# Patient Record
Sex: FEMALE | Race: WHITE | NOT HISPANIC OR LATINO | Employment: OTHER | URBAN - METROPOLITAN AREA
[De-identification: names, ages, dates, MRNs, and addresses within clinical notes are randomized per-mention and may not be internally consistent; named-entity substitution may affect disease eponyms.]

---

## 2017-05-26 ENCOUNTER — CONVERSION ENCOUNTER (OUTPATIENT)
Dept: MAMMOGRAPHY | Facility: HOSPITAL | Age: 72
End: 2017-05-26

## 2020-03-03 ENCOUNTER — OFFICE VISIT CONVERTED (OUTPATIENT)
Dept: OTHER | Facility: HOSPITAL | Age: 75
End: 2020-03-03
Attending: NURSE PRACTITIONER

## 2020-03-03 ENCOUNTER — CONVERSION ENCOUNTER (OUTPATIENT)
Dept: OTHER | Facility: HOSPITAL | Age: 75
End: 2020-03-03

## 2020-03-03 ENCOUNTER — HOSPITAL ENCOUNTER (OUTPATIENT)
Dept: OTHER | Facility: HOSPITAL | Age: 75
Discharge: HOME | End: 2020-03-03
Attending: NURSE PRACTITIONER

## 2020-03-03 LAB
ALBUMIN SERPL-MCNC: 3.6 G/DL (ref 3.5–5)
ALBUMIN/GLOB SERPL: 0.8 {RATIO} (ref 1.4–2.6)
ALP SERPL-CCNC: 121 U/L (ref 43–160)
ALT SERPL-CCNC: 11 U/L (ref 10–40)
ANION GAP SERPL CALC-SCNC: 21 MMOL/L (ref 8–19)
AST SERPL-CCNC: 18 U/L (ref 15–50)
BASOPHILS # BLD AUTO: 0.07 10*3/UL (ref 0–0.2)
BASOPHILS NFR BLD AUTO: 1.3 % (ref 0–3)
BILIRUB SERPL-MCNC: 0.4 MG/DL (ref 0.2–1.3)
BUN SERPL-MCNC: 12 MG/DL (ref 5–25)
BUN/CREAT SERPL: 15 {RATIO} (ref 6–20)
CALCIUM SERPL-MCNC: 9.8 MG/DL (ref 8.7–10.4)
CHLORIDE SERPL-SCNC: 101 MMOL/L (ref 99–111)
CHOLEST SERPL-MCNC: 182 MG/DL (ref 107–200)
CHOLEST/HDLC SERPL: 4 {RATIO} (ref 3–6)
CONV ABS IMM GRAN: 0.02 10*3/UL (ref 0–0.2)
CONV CO2: 23 MMOL/L (ref 22–32)
CONV IMMATURE GRAN: 0.4 % (ref 0–1.8)
CONV TOTAL PROTEIN: 8 G/DL (ref 6.3–8.2)
CREAT UR-MCNC: 0.79 MG/DL (ref 0.5–0.9)
DEPRECATED RDW RBC AUTO: 64.7 FL (ref 36.4–46.3)
EOSINOPHIL # BLD AUTO: 0.34 10*3/UL (ref 0–0.7)
EOSINOPHIL # BLD AUTO: 6.1 % (ref 0–7)
ERYTHROCYTE [DISTWIDTH] IN BLOOD BY AUTOMATED COUNT: 15.9 % (ref 11.7–14.4)
GFR SERPLBLD BASED ON 1.73 SQ M-ARVRAT: >60 ML/MIN/{1.73_M2}
GLOBULIN UR ELPH-MCNC: 4.4 G/DL (ref 2–3.5)
GLUCOSE SERPL-MCNC: 93 MG/DL (ref 65–99)
HCT VFR BLD AUTO: 36.3 % (ref 37–47)
HDLC SERPL-MCNC: 46 MG/DL (ref 40–60)
HGB BLD-MCNC: 11.2 G/DL (ref 12–16)
LDLC SERPL CALC-MCNC: 106 MG/DL (ref 70–100)
LYMPHOCYTES # BLD AUTO: 0.62 10*3/UL (ref 1–5)
LYMPHOCYTES NFR BLD AUTO: 11.2 % (ref 20–45)
MCH RBC QN AUTO: 34.4 PG (ref 27–31)
MCHC RBC AUTO-ENTMCNC: 30.9 G/DL (ref 33–37)
MCV RBC AUTO: 111.3 FL (ref 81–99)
MONOCYTES # BLD AUTO: 0.25 10*3/UL (ref 0.2–1.2)
MONOCYTES NFR BLD AUTO: 4.5 % (ref 3–10)
NEUTROPHILS # BLD AUTO: 4.26 10*3/UL (ref 2–8)
NEUTROPHILS NFR BLD AUTO: 76.5 % (ref 30–85)
NRBC CBCN: 0 % (ref 0–0.7)
OSMOLALITY SERPL CALC.SUM OF ELEC: 291 MOSM/KG (ref 273–304)
PLATELET # BLD AUTO: 461 10*3/UL (ref 130–400)
PMV BLD AUTO: 11.5 FL (ref 9.4–12.3)
POTASSIUM SERPL-SCNC: 4.2 MMOL/L (ref 3.5–5.3)
RBC # BLD AUTO: 3.26 10*6/UL (ref 4.2–5.4)
SODIUM SERPL-SCNC: 141 MMOL/L (ref 135–147)
T4 FREE SERPL-MCNC: 1.4 NG/DL (ref 0.9–1.8)
TRIGL SERPL-MCNC: 151 MG/DL (ref 40–150)
TSH SERPL-ACNC: 2.98 M[IU]/L (ref 0.27–4.2)
VIT B12 SERPL-MCNC: 484 PG/ML (ref 211–911)
VLDLC SERPL-MCNC: 30 MG/DL (ref 5–37)
WBC # BLD AUTO: 5.56 10*3/UL (ref 4.8–10.8)

## 2020-03-04 LAB
IRON SATN MFR SERPL: 20 % (ref 20–55)
IRON SERPL-MCNC: 60 UG/DL (ref 60–170)
TIBC SERPL-MCNC: 302 UG/DL (ref 245–450)
TRANSFERRIN SERPL-MCNC: 211 MG/DL (ref 250–380)

## 2020-03-05 LAB
CONV ANTI MICROSOMAL AB: 26 IU/ML (ref 0–34)
THYROGLOBULIN ANTIBODY: <1 IU/ML (ref 0–0.9)

## 2020-04-06 ENCOUNTER — TELEMEDICINE CONVERTED (OUTPATIENT)
Dept: OTHER | Facility: HOSPITAL | Age: 75
End: 2020-04-06
Attending: NURSE PRACTITIONER

## 2020-04-07 ENCOUNTER — HOSPITAL ENCOUNTER (OUTPATIENT)
Dept: OTHER | Facility: HOSPITAL | Age: 75
Discharge: HOME | End: 2020-04-07
Attending: NURSE PRACTITIONER

## 2020-04-07 LAB
BASOPHILS # BLD AUTO: 0.05 10*3/UL (ref 0–0.2)
BASOPHILS NFR BLD AUTO: 1.1 % (ref 0–3)
CONV ABS IMM GRAN: 0.02 10*3/UL (ref 0–0.2)
CONV IMMATURE GRAN: 0.4 % (ref 0–1.8)
DEPRECATED RDW RBC AUTO: 64.1 FL (ref 36.4–46.3)
EOSINOPHIL # BLD AUTO: 0.41 10*3/UL (ref 0–0.7)
EOSINOPHIL # BLD AUTO: 8.9 % (ref 0–7)
ERYTHROCYTE [DISTWIDTH] IN BLOOD BY AUTOMATED COUNT: 15.7 % (ref 11.7–14.4)
HCT VFR BLD AUTO: 34.4 % (ref 37–47)
HGB BLD-MCNC: 10.5 G/DL (ref 12–16)
LYMPHOCYTES # BLD AUTO: 0.61 10*3/UL (ref 1–5)
LYMPHOCYTES NFR BLD AUTO: 13.3 % (ref 20–45)
MCH RBC QN AUTO: 34.1 PG (ref 27–31)
MCHC RBC AUTO-ENTMCNC: 30.5 G/DL (ref 33–37)
MCV RBC AUTO: 111.7 FL (ref 81–99)
MONOCYTES # BLD AUTO: 0.35 10*3/UL (ref 0.2–1.2)
MONOCYTES NFR BLD AUTO: 7.6 % (ref 3–10)
NEUTROPHILS # BLD AUTO: 3.16 10*3/UL (ref 2–8)
NEUTROPHILS NFR BLD AUTO: 68.7 % (ref 30–85)
NRBC CBCN: 0 % (ref 0–0.7)
PLATELET # BLD AUTO: 377 10*3/UL (ref 130–400)
PMV BLD AUTO: 11.6 FL (ref 9.4–12.3)
RBC # BLD AUTO: 3.08 10*6/UL (ref 4.2–5.4)
WBC # BLD AUTO: 4.6 10*3/UL (ref 4.8–10.8)

## 2020-05-11 ENCOUNTER — TELEPHONE CONVERTED (OUTPATIENT)
Dept: OTHER | Facility: HOSPITAL | Age: 75
End: 2020-05-11
Attending: NURSE PRACTITIONER

## 2020-05-13 ENCOUNTER — HOSPITAL ENCOUNTER (OUTPATIENT)
Dept: OTHER | Facility: HOSPITAL | Age: 75
Discharge: HOME | End: 2020-05-13
Attending: NURSE PRACTITIONER

## 2020-05-14 LAB
C DIFF TOX B STL QL CT TISS CULT: NEGATIVE
CONV 027 TOXIN: NEGATIVE

## 2020-05-15 LAB — BACTERIA SPEC AEROBE CULT: NORMAL

## 2020-06-15 ENCOUNTER — OFFICE VISIT CONVERTED (OUTPATIENT)
Dept: ONCOLOGY | Facility: HOSPITAL | Age: 75
End: 2020-06-15
Attending: INTERNAL MEDICINE

## 2020-06-15 ENCOUNTER — HOSPITAL ENCOUNTER (OUTPATIENT)
Dept: ONCOLOGY | Facility: HOSPITAL | Age: 75
Discharge: HOME | End: 2020-06-15
Attending: INTERNAL MEDICINE

## 2020-06-15 LAB
ALBUMIN SERPL-MCNC: 3.8 G/DL (ref 3.5–5)
ALBUMIN/GLOB SERPL: 1 {RATIO} (ref 1.4–2.6)
ALP SERPL-CCNC: 124 U/L (ref 43–160)
ALT SERPL-CCNC: 9 U/L (ref 10–40)
ANION GAP SERPL CALC-SCNC: 15 MMOL/L (ref 8–19)
AST SERPL-CCNC: 15 U/L (ref 15–50)
BASOPHILS # BLD AUTO: 0.06 10*3/UL (ref 0–0.2)
BASOPHILS NFR BLD AUTO: 1 % (ref 0–3)
BILIRUB SERPL-MCNC: 0.32 MG/DL (ref 0.2–1.3)
BUN SERPL-MCNC: 12 MG/DL (ref 5–25)
BUN/CREAT SERPL: 15 {RATIO} (ref 6–20)
CALCIUM SERPL-MCNC: 9.4 MG/DL (ref 8.7–10.4)
CHLORIDE SERPL-SCNC: 101 MMOL/L (ref 99–111)
CONV ABS IMM GRAN: 0.02 10*3/UL (ref 0–0.2)
CONV CO2: 26 MMOL/L (ref 22–32)
CONV IMMATURE GRAN: 0.3 % (ref 0–1.8)
CONV TOTAL PROTEIN: 7.7 G/DL (ref 6.3–8.2)
CREAT UR-MCNC: 0.81 MG/DL (ref 0.5–0.9)
DEPRECATED RDW RBC AUTO: 61.9 FL (ref 36.4–46.3)
EOSINOPHIL # BLD AUTO: 0.46 10*3/UL (ref 0–0.7)
EOSINOPHIL # BLD AUTO: 7.8 % (ref 0–7)
ERYTHROCYTE [DISTWIDTH] IN BLOOD BY AUTOMATED COUNT: 15 % (ref 11.7–14.4)
FERRITIN SERPL-MCNC: 123 NG/ML (ref 10–200)
FOLATE SERPL-MCNC: 10.4 NG/ML (ref 4.8–20)
GFR SERPLBLD BASED ON 1.73 SQ M-ARVRAT: >60 ML/MIN/{1.73_M2}
GLOBULIN UR ELPH-MCNC: 3.9 G/DL (ref 2–3.5)
GLUCOSE SERPL-MCNC: 98 MG/DL (ref 65–99)
HCT VFR BLD AUTO: 35.1 % (ref 37–47)
HGB BLD-MCNC: 11 G/DL (ref 12–16)
IRON SATN MFR SERPL: 24 % (ref 20–55)
IRON SERPL-MCNC: 71 UG/DL (ref 60–170)
LYMPHOCYTES # BLD AUTO: 0.66 10*3/UL (ref 1–5)
LYMPHOCYTES NFR BLD AUTO: 11.2 % (ref 20–45)
MCH RBC QN AUTO: 34.7 PG (ref 27–31)
MCHC RBC AUTO-ENTMCNC: 31.3 G/DL (ref 33–37)
MCV RBC AUTO: 110.7 FL (ref 81–99)
MONOCYTES # BLD AUTO: 0.35 10*3/UL (ref 0.2–1.2)
MONOCYTES NFR BLD AUTO: 5.9 % (ref 3–10)
NEUTROPHILS # BLD AUTO: 4.34 10*3/UL (ref 2–8)
NEUTROPHILS NFR BLD AUTO: 73.8 % (ref 30–85)
NRBC CBCN: 0 % (ref 0–0.7)
OSMOLALITY SERPL CALC.SUM OF ELEC: 286 MOSM/KG (ref 273–304)
PLATELET # BLD AUTO: 435 10*3/UL (ref 130–400)
PMV BLD AUTO: 11.2 FL (ref 9.4–12.3)
POTASSIUM SERPL-SCNC: 4 MMOL/L (ref 3.5–5.3)
RBC # BLD AUTO: 3.17 10*6/UL (ref 4.2–5.4)
SODIUM SERPL-SCNC: 138 MMOL/L (ref 135–147)
TIBC SERPL-MCNC: 295 UG/DL (ref 245–450)
TRANSFERRIN SERPL-MCNC: 206 MG/DL (ref 250–380)
TSH SERPL-ACNC: 1.17 M[IU]/L (ref 0.27–4.2)
VIT B12 SERPL-MCNC: 778 PG/ML (ref 211–911)
WBC # BLD AUTO: 5.89 10*3/UL (ref 4.8–10.8)

## 2020-06-22 ENCOUNTER — OFFICE VISIT CONVERTED (OUTPATIENT)
Dept: ONCOLOGY | Facility: HOSPITAL | Age: 75
End: 2020-06-22
Attending: INTERNAL MEDICINE

## 2020-06-23 ENCOUNTER — HOSPITAL ENCOUNTER (OUTPATIENT)
Dept: OTHER | Facility: HOSPITAL | Age: 75
Discharge: HOME | End: 2020-06-23
Attending: INTERNAL MEDICINE

## 2020-06-23 LAB
BASOPHILS # BLD AUTO: 0.05 10*3/UL (ref 0–0.2)
BASOPHILS # BLD: 3 % (ref 0–3)
BASOPHILS NFR BLD AUTO: 0.9 % (ref 0–3)
BURR CELLS BLD QL SMEAR: SLIGHT
CLUMPED PLATELETS: ABNORMAL
CONV ABS BANDS: 0 % (ref 1–5)
CONV ABS IMM GRAN: 0.01 10*3/UL (ref 0–0.2)
CONV ANISOCYTES: SLIGHT
CONV IMMATURE GRAN: 0.2 % (ref 0–1.8)
CONV SEGMENTED NEUTROPHILS: 67 % (ref 45–70)
DACRYOCYTES BLD QL SMEAR: ABNORMAL
DEPRECATED RDW RBC AUTO: 64 FL (ref 36.4–46.3)
EOSINOPHIL # BLD AUTO: 0.53 10*3/UL (ref 0–0.7)
EOSINOPHIL # BLD AUTO: 10 % (ref 0–7)
EOSINOPHIL NFR BLD AUTO: 12 % (ref 0–7)
ERYTHROCYTE [DISTWIDTH] IN BLOOD BY AUTOMATED COUNT: 15.4 % (ref 11.7–14.4)
HCT VFR BLD AUTO: 33.9 % (ref 37–47)
HGB BLD-MCNC: 10.2 G/DL (ref 12–16)
LARGE PLATELETS: SLIGHT
LDH SERPL-CCNC: 209 U/L (ref 120–240)
LYMPHOCYTES # BLD AUTO: 0.83 10*3/UL (ref 1–5)
LYMPHOCYTES NFR BLD AUTO: 15.7 % (ref 20–45)
MACROCYTES BLD QL SMEAR: SLIGHT
MCH RBC QN AUTO: 34.1 PG (ref 27–31)
MCHC RBC AUTO-ENTMCNC: 30.1 G/DL (ref 33–37)
MCV RBC AUTO: 113.4 FL (ref 81–99)
MONOCYTES # BLD AUTO: 0.32 10*3/UL (ref 0.2–1.2)
MONOCYTES NFR BLD AUTO: 6.1 % (ref 3–10)
MONOCYTES NFR BLD MANUAL: 1 % (ref 3–10)
NEUTROPHILS # BLD AUTO: 3.54 10*3/UL (ref 2–8)
NEUTROPHILS NFR BLD AUTO: 67.1 % (ref 30–85)
NRBC CBCN: 0 % (ref 0–0.7)
NUC CELL # PRT MANUAL: 0 /100{WBCS}
OVALOCYTES BLD QL SMEAR: SLIGHT
PLAT MORPH BLD: NORMAL
PLATELET # BLD AUTO: 433 10*3/UL (ref 130–400)
PMV BLD AUTO: 11.9 FL (ref 9.4–12.3)
POIKILOCYTOSIS BLD QL SMEAR: SLIGHT
POLYCHROMASIA BLD QL SMEAR: ABNORMAL
RBC # BLD AUTO: 2.99 10*6/UL (ref 4.2–5.4)
RETICS # AUTO: 0.07 10*6/UL (ref 0.02–0.08)
RETICS/RBC NFR AUTO: 2.34 % (ref 0.5–1.7)
SMALL PLATELETS BLD QL SMEAR: ADEQUATE
VARIANT LYMPHS NFR BLD MANUAL: 17 % (ref 20–45)
WBC # BLD AUTO: 5.28 10*3/UL (ref 4.8–10.8)

## 2020-06-25 LAB — HAPTOGLOB SERPL-MCNC: 224 MG/DL (ref 42–346)

## 2020-06-29 LAB
ALBUMIN 24H MFR UR ELPH: 24.8 %
ALPHA1 GLOB 24H MFR UR ELPH: 1.1 %
ALPHA2 GLOB 24H MFR UR ELPH: 10.1 %
B-GLOBULIN MFR UR ELPH: 32.8 %
CONV IMMUNOFIXATION (URINE): NORMAL
CONV PE NOTE: NORMAL
GAMMA GLOB 24H MFR UR ELPH: 31.2 %
M PROTEIN MFR UR ELPH: NORMAL %
PROT UR-MCNC: 25.7 MG/DL

## 2020-07-23 ENCOUNTER — OFFICE VISIT CONVERTED (OUTPATIENT)
Dept: ONCOLOGY | Facility: HOSPITAL | Age: 75
End: 2020-07-23
Attending: INTERNAL MEDICINE

## 2020-09-22 ENCOUNTER — OFFICE VISIT CONVERTED (OUTPATIENT)
Dept: ONCOLOGY | Facility: HOSPITAL | Age: 75
End: 2020-09-22
Attending: INTERNAL MEDICINE

## 2020-12-22 ENCOUNTER — OFFICE VISIT CONVERTED (OUTPATIENT)
Dept: ONCOLOGY | Facility: HOSPITAL | Age: 75
End: 2020-12-22
Attending: INTERNAL MEDICINE

## 2021-03-22 ENCOUNTER — OFFICE VISIT CONVERTED (OUTPATIENT)
Dept: ONCOLOGY | Facility: HOSPITAL | Age: 76
End: 2021-03-22
Attending: INTERNAL MEDICINE

## 2021-05-12 NOTE — PROGRESS NOTES
Quick Note      Patient Name: Calli Pina   Patient ID: [id number]   Sex: Female   YOB: 1945    Primary Care Provider: Ara LLANOS   Referring Provider: Ara LLANOS    Visit Date: April 6, 2020    Provider: ROMI Mcclure   Location: [facility]   Location Address: [address] [place] [address]  [id number]   Location Phone: [phone]          History Of Present Illness  TELEHEALTH VISIT  Chief Complaint: Follow up   Calli Pina is a 75 year old /White female who is presenting for evaluation via telehealth visit. Verbal consent obtained before beginning visit.   Provider spent 11 minutes with the patient during telehealth visit.   The following staff were present during this visit: ROMI Flower and Laura Hope CMA   Past Medical History/Overview of Patient Symptoms     **Consent for Telephone visit given    Follow up on Fatigue, Vitamin B12. Please see last visit hemoglobin hematocrit were 11.2 and 36.3, .3, MCH 34.4, platelets were 461.  At that time we increased her vitamin B12 to weekly and we are going to repeat her CBC at next visit. Continues to complains of weight loss and weakness. She is down to 152 this morning. Has lost about 20 pounds since the first of the year.     Complains of middle/lower back pain for long time, seems to be getting worse. States she can do very little and will have to sit down to rest. Pain is the worse when she is up and moving and cleaning the house, pain eases with rest. Describes pain as a strong dull ache, denies any problems with her legs or feet. Admits that she has taken Ibuprofen in the past with improvement.     Time in: 1026  Time: 1037           Assessment  · Vitamin B12 deficiency     266.2/E53.8  · Weakness     780.79/R53.1  · Weight loss, non-intentional     783.21/R63.4  · Abnormal CBC     790.6/R79.89  · Low back pain     724.2/M54.5    Problems Reconciled  Plan  · Orders  o Physician  Telephone Evaluation, 11-20 minutes (40873) - - 04/06/2020  · Medications  o naproxen 500 mg oral tablet,delayed release (DR/EC)   SIG: take 1 tablet (500 mg) by oral route 2 times per day with food PRN for low back pain   DISP: (60) tablets with 1 refills  Prescribed on 04/06/2020     o Medications have been Reconciled  o Transition of Care or Provider Policy  · Instructions  o Plan Of Care: We will repeat patient's CBC, pending those results we will consider hematology referral. Discussed this with patient and she voiced understanding and all questions answered.  o Patient was educated/instructed on their diagnosis, treatment and medications prior to discharge from the clinic today.  o Take all medications as prescribed/directed.  o After discussing patient's back pain with her it appears that it is likely a degenerative disc disease, but we will not know this without an x-ray. I do not feel we need to do an x-ray at this time related to the Covid 19 pandemic, we will treat her with naproxen 500 mg twice daily on an as-needed basis for pain. Instructed patient to take this with food and that she only needs to take it when she is hurting. Patient voiced understanding. We will get an x-ray as soon as restrictions have been lifted.  · Disposition  o Follow Up PRN            Electronically Signed by: ROMI Mcclure -Author on April 6, 2020 11:44:18 AM

## 2021-05-13 NOTE — PROGRESS NOTES
Quick Note      Patient Name: Calli Pina   Patient ID: [id number]   Sex: Female   YOB: 1945    Primary Care Provider: Ara LLANOS   Referring Provider: Ara LLANOS    Visit Date: May 11, 2020    Provider: ROMI Mcclure   Location: [facility]   Location Address: [address] [place] [address]  [id number]   Location Phone: [phone]          History Of Present Illness  TELEHEALTH TELEPHONE VISIT  Chief Complaint: Follow up/ Diarrhea   Calli Pina is a 75 year old /White female who is presenting for evaluation via telehealth telephone visit. Verbal consent obtained before beginning visit.   Provider spent 11 minutes with the patient during telehealth visit.   The following staff were present during this visit: ROMI Flower and Laura Hope CMA   Past Medical History/Overview of Patient Symptoms     Patient consented to consult via telephone    Follow up on Low back pain and no energy. Has stopped taking naproxen for low back pain due to stomach issues. Admits to improvement with the Naproxen and had some abdominal pain therefore she has stopped and her stomach pain has improved.     Complains of diarrhea for years, worse 10-20 minutes after big meals. Concerned about C.diff. Patient does admit to some abdominal pain with diarrhea, denies any fever or chills. Patient has not been on any antibiotics recently or been in the hospital.  Not currently taking iron medication.    Patient does have an abnormal CBC white blood cells 4.6, RBCs 3.08, hemoglobin 10.5, hematocrit 34.4, , platelets 377.  Patient has seen Dr. pool in the past and was put through multiple testing.  All of which was negative.  Her last C scope was a few years ago at [place] they were not able to make it through her complete bowel and so they did a small bowel series.  She does have an appointment with hematology in [facility] on [address].    Time In: 1205  Time Out: 1216            Assessment  · Diarrhea     787.91/R19.7  · Fatigue     780.79/R53.83  · Vitamin B12 deficiency     266.2/E53.8  · Abnormal CBC     790.6/R79.89  · Weakness     780.79/R53.1    Problems Reconciled  Plan  · Orders  o C difficile Toxigenic Assay (PCR) [facility] (49570) - 787.91/R19.7 - 05/11/2020  o Giardia and Cryptosporidium Enzyme Immunoassay [facility] (25385, 76161) - 787.91/R19.7 - 05/11/2020  o Lactoferrin (Fecal) Qualitative (44741) - 787.91/R19.7 - 05/11/2020  o Stool culture and sensitivity (98660) - 787.91/R19.7 - 05/11/2020  o Physician Telephone Evaluation, 11-20 minutes (95364) - - 05/11/2020  o Occult blood, fecal, guaiac, 1-3 samples [facility] (41746) - 790.6/R79.89, 787.91/R19.7 - 05/11/2020  · Medications  o Iron (ferrous sulfate) 325 mg (65 mg iron) oral tablet   SIG: take 1 tablet (325 mg) by oral route once daily   DISP: (0) tablet with 0 refills  Discontinued on 05/11/2020     o naproxen 500 mg oral tablet,delayed release (DR/EC)   SIG: take 1 tablet (500 mg) by oral route 2 times per day with food PRN for low back pain   DISP: (60) tablets with 1 refills  Discontinued on 05/11/2020     o Medications have been Reconciled  o Transition of Care or Provider Policy  · Instructions  o Plan Of Care: At this time we will continue current plan of care, will reach out and get her records from previous hematologist and C scope and small bowel series. We will forward this information on to new hematology. We will also get a stool study to follow-up with the diarrhea. Discussed with patient that it may be possible that she will need another colonoscopy. Patient voiced understanding and all questions answered.  o Patient instructed to seek medical attention urgently for new or worsening symptoms.  o Patient is taking medications as prescribed and doing well.   o Call the office with any concerns or questions.  o Discussed Covid-19 precautions including, but not limited to, social distancing, avoid touching your face,  and hand washing.   · Disposition  o Follow Up PRN     EMR dragon/transcription disclaimer: Much of this encounter note is an electronic transcription/translation of spoken language to printed text.  Electronic translation of spoken language may permit erroneous, or at times nonsensical words or phrases to be inadvertently transcribed; although I have reviewed the note for such errors, some may still exist             Electronically Signed by: ROMI Mcclure -Author on May 11, 2020 02:14:35 PM

## 2021-05-15 VITALS
HEIGHT: 65 IN | DIASTOLIC BLOOD PRESSURE: 72 MMHG | SYSTOLIC BLOOD PRESSURE: 130 MMHG | HEART RATE: 68 BPM | OXYGEN SATURATION: 94 % | WEIGHT: 161.25 LBS | TEMPERATURE: 96.8 F | BODY MASS INDEX: 26.87 KG/M2

## 2021-05-28 VITALS
RESPIRATION RATE: 14 BRPM | HEIGHT: 65 IN | TEMPERATURE: 98 F | BODY MASS INDEX: 25.49 KG/M2 | OXYGEN SATURATION: 94 % | WEIGHT: 153 LBS | DIASTOLIC BLOOD PRESSURE: 68 MMHG | HEART RATE: 76 BPM | SYSTOLIC BLOOD PRESSURE: 128 MMHG

## 2021-05-28 NOTE — PROGRESS NOTES
"Patient: ELEUTERIO PINA     Acct: [id number]     Report: #[id number]  UNIT #: [id number]     : 1945    Encounter Date:06/15/2020  PRIMARY CARE: STEVO BOBO  ***Signed***  --------------------------------------------------------------------------------------------------------------------  NURSE INTAKE      Visit Type      New Patient Visit            Chief Complaint      ABNORMAL CBC            Referring Provider/Copies To      Referring Provider:  STEVO BOBO      Primary Care Provider:  STEVO BOBO      Copies To:   STEVO BOBO            History and Present Illness      Past History      Ms. Pina is a 75-year-old female who was referred to me by her primary care     provider Stevo Bobo for macrocytic anemia.            The patient's most recent labs were on 2020.  They show WBC count of 4.6,     hemoglobin 10.5, .7, platelet count 377, absolute lymphocyte count 0.61.     The rest of the differential was complete normal.  Patient states she has felt     \"crummy\" for the last year or more.  She is also been losing weight.  She has     diarrhea often after every meal.  Her last colonoscopy was a few years ago and     she believes it was incomplete.            She is on levothyroxine for hypothyroidism.  She said she had radioactive iodine    ablation in .  She reports her thyroid levels have been normal recently.      She recently had a cardiac ablation due to atrial fibrillation.  Records from     primary care provider states that she has been on monthly B12 injections for her    macrocytic anemia.  She also started taking oral iron supplementation in     2020 for fatigue but this did not help.            Patient states that she saw Dr. Adkins about 5 years ago for the same issues.  She    is unaware if there is any specific diagnosis but says that she does not wish to    go back there.            PAST, FAMILY   Past Medical History      Past Medical History:  " Thyroid Disease      Other PMH:        GLAUCOMA      Hematology/Oncology (F):  Anemia            Past Surgical History      Cataract Surgery, Cholecystectomy            HEART ABLATION, HEART MONITOR      RADIOACTIVE IDOINE FOR THYROID (RADITION)            Family History      Family History:  Stomach Cancer (BROTHER)            Maternal grandmother had an undiagnosed blood condition and  at the      age of 70, brother with stomach cancer            Social History      Marital Status:        Lives independently:  Yes      Number of Children:  3            Tobacco Use      Tobacco status:  Former smoker (Quit in )            Alcohol Use      Alcohol intake:  None            Substance Use      Substance use:  Denies use            REVIEW OF SYSTEMS      General:  Admits: Fatigue, Weight Loss;          Denies: Appetite Change, Fever, Night Sweats, Weight Gain      Eye:  Denies Blurred Vision, Denies Corrective Lenses, Denies Diplopia, Denies     Vision Changes      ENT:  Denies Headache; Admits Hearing Loss; Denies Hoarseness, Denies Sore     Throat      Cardiovascular:  Denies Chest Pain; Admits Palpitations      Respiratory:  Denies: Cough, Coughing Blood, Productive Cough, Shortness of Air,    Wheezing      Gastrointestinal:  Admits Diarrhea; Denies Bloody Stools, Denies Constipation,     Denies Nausea/Vomiting, Denies Problem Swallowing, Denies Unable to Control     Bowels      Genitourinary:  Denies Blood in Urine; Admits Incontinence (URINARY); Denies     Painful Urination      Musculoskeletal:  Admits Back Pain; Denies Muscle Pain, Denies Painful Joints      Integumentary:  Denies Itching, Denies Lesions, Denies Rash      Neurologic:  Admits Dizziness; Denies Numbness\Tingling, Denies Seizures      Psychiatric:  Denies Anxiety, Denies Depression      Endocrine:  Denies Cold Intolerance, Denies Heat Intolerance      Hematologic/Lymphatic:  Denies Bruising, Denies Bleeding, Denies Enlarged Lymph      Nodes      Reproductive:  Denies: Menopause, Heavy Periods, Pregnant, Still Menstruating            VITAL SIGNS AND SCORES      Vitals      Height 5 ft 4.96 in / 165 cm      Weight 152 lbs 15.988 oz / 69.4 kg      BSA 1.76 m2      BMI 25.5 kg/m2      Temperature 98.0 F / 36.67 C - Temporal      Pulse 76      Respirations 14      Blood Pressure 128/68 Sitting, Right Arm      Pulse Oximetry 94%, ROOM AIR            Pain Score      Experiencing any pain?:  Yes (BACK PAIN)      Pain Scale Used:  Numerical      Pain Intensity:  7            Fatigue Score      Experiencing any fatigue?:  Yes      Fatigue (0-10 scale):  8            EXAM      General: Alert, cooperative, no acute distress      Eyes: Anicteric sclera, PERRLA      HEENT: Oropharynx clear, no exudates      Respiratory: CTAB, normal respiratory effort      Abdomen: Normal active bowel sounds, no tenderness, no distention      Cardiovascular: RRR, no murmur, no peripheral edema      Skin: Normal tone, no rash, no lesions      Psychiatric: Appropriate affect, intact judgment      Neurologic: No focal sensory or motor deficits, no weakness, numbness, dizziness      Musculoskeletal: Normal muscle strength, normal muscle tone      Extremities: No clubbing, cyanosis, or deformities            PREVENTION      Hx Influenza Vaccination:  Yes      Date Influenza Vaccine Given:  Oct 1, 2019      Influenza Vaccine Declined:  No      2 or More Falls Past Year?:  Yes      Fall Past Year with Injury?:  No      Hx Pneumococcal Vaccination:  No      Chart initiated by      ADELA STONE MA            ALLERGY/MEDS      Allergies      Coded Allergies:             *No Known Allergies (Verified  Allergy, Unknown, 6/15/20)            Medications            Propylene Glycol/ (Systane Gel Eye Drops) 10 Ml Drops.gel      1 DROPS EYE EACH QDAY PRN for EYE PROTECTION, #10 ML         Reported         6/15/20       (Areds 2 - Eye Vit)   No Conflict Check      1 TAB PO BID          Prov: MARCIE CHÁVEZ         6/15/20       (Eye Drops - Glaucoma)   No Conflict Check      1 DROPS EYE EACH HS         Reported         6/15/20       Levothyroxine (Levothyroxine) 0.088 Mg Tablet      0.088 MG PO QDAY@07, #30 TAB 0 Refills         Reported         6/15/20       Omeprazole (priLOSEC) 40 Mg Capsule.dr      40 MG PO QDAY for 30 Days, #30 CAP 0 Refills         Reported         6/15/20       Cyanocobalamin (Vitamin B-12) 1,000 Mcg/Ml Vial      1000 MCG IM QMONTH, VIAL 0 Refills         Reported         8/20/09      Medications Reviewed:  Changes made to meds            IMPRESSION/PLAN      Impression      75-year-old female with macrocytic anemia.            Diagnosis      Abnormal CBC - R79.89            Anemia - D64.9            B12 deficiency - E53.8            Notes      New Medications      * OMEPRAZOLE (priLOSEC) 40 MG CAPSULE.DR: 40 MG PO QDAY 30 Days #30      * Levothyroxine 0.088 MG TABLET: 0.088 MG PO QDAY@07 #30      * (EYE DROPS - GLAUCOMA): 1 DROPS EYE EACH HS      * (AREDS 2 - EYE VIT): 1 TAB PO BID      * Propylene Glycol/ (Systane Gel Eye Drops) 10 ML DROPS.GEL: 1 DROPS EYE       EACH QDAY PRN EYE PROTECTION #10      New Diagnostics      * CBC With Auto Diff, Routine         Dx: Abnormal CBC - R79.89      * CMP Comp Metabolic Panel, Routine         Dx: Abnormal CBC - R79.89      * B12      Dx: Abnormal CBC - R79.89      * Iron Profile, Routine         Dx: Abnormal CBC - R79.89      * Ferritin Level, Routine         Dx: Abnormal CBC - R79.89      * Tsh (Ultra-Sensitive, Routine         Dx: Abnormal CBC - R79.89            Plan      Macrocytic anemia: Typical causes of macrocytic anemia are B12 and folate     deficiency.  The patient has been on B12 injections but is unclear for how long.     I will repeat basic labs today including a CBC with differential, CMP,     B12/folate, iron studies with ferritin, and TSH.  I will call her in 1 week with    results of these labs.  Further  labs will be based on the results of these.            Diarrhea: Patient reports having diarrhea after every meal.  I suggested that sh    e should have a repeat colonoscopy I will discuss this again with the patient at    the follow-up appointment.  This may be causing poor absorption of     micronutrients leading to her hematologic abnormalities.            Patient Education      Patient Education Provided:  Yes            Electronically signed by MARCIE CHÁVEZ  06/21/2020 21:36       Disclaimer: Converted document may not contain table formatting or lab diagrams. Please see [employer] for the authenticated document.

## 2021-05-28 NOTE — PROGRESS NOTES
"Patient: CALLI PINA     Acct: [id number]     Report: #[id number]  UNIT #: [id number]     : 1945    Encounter Date:2021  PRIMARY CARE: STEVO BOBO  ***Signed***  --------------------------------------------------------------------------------------------------------------------  TELEHEALTH NOTE      Past Oncology Illness History      Ms. Pina is a 76-year-old female who was referred to me by her primary care     provider Stevo Bobo for macrocytic anemia.            The patient's most recent labs were on 2020.  They show WBC count of 4.6,     hemoglobin 10.5, .7, platelet count 377, absolute lymphocyte count 0.61.     The rest of the differential was complete normal.  Patient states she has felt     \"crummy\" for the last year or more.  She is also been losing weight.  She has     diarrhea often after every meal.  Her last colonoscopy was a few years ago and     she believes it was incomplete.            She is on levothyroxine for hypothyroidism.  She said she had radioactive iodine    ablation in .  She reports her thyroid levels have been normal recently.      She recently had a cardiac ablation due to atrial fibrillation.  Records from     primary care provider states that she has been on monthly B12 injections for her    macrocytic anemia.  She also started taking oral iron supplementation in     eb2020 for fatigue but this did not help.            Patient states that she saw Dr. Adkins about 5 years ago for the same issues.  She    is unaware if there is any specific diagnosis but says that she does not wish to    go back there.            2020: WBC count 5.28, hemoglobin 10.2, , platelet count 433,     reticulocytes 2.34%, LDH within normal limits, transferrin saturation and     ferritin within normal limits.            History of Present Illness            Chief Complaint: (abn cbc f/u)            Calli Pina is presenting for " evaluation via Telehealth visit by phone.     Verbal consent obtained before beginning visit.            Provider spent (14) minutes with the patient during telehealth visit.            The following staff were present during the visit: (Tonya Main, RN)                         Overview of Symptoms      I contacted the patient today regarding recent labs.  She had these done at [place] which is where she sees her primary care provider.  Her labs are     essentially unchanged from previous.  Her hemoglobin is down to 10.1 but this is    not significantly different from other values in the computer.  Last time it was    11.0 but has been as low as 10 before.  Her MCV is also 104.5.  This has been     higher as well.  I explained to the patient that I would like to continue to get    a CBC with differential every 3 months since she has not had a bone marrow bi    opsy.  However, the patient says she is feeling better than she did in the past.     Her energy is improved.  Her weight loss has now stabilized.  Her weight is 143    pounds and was previously 176 but has not dropped further over the last several     months.  Patient gets labs through her primary care provider every 6 months and     asked that we alternate visits and labs with the other provider.            Allergies/Medications      Allergies:        Coded Allergies:             *No Known Allergies (Verified  Allergy, Unknown, 3/22/21)      Medications    Last Reconciled on 3/22/21 18:15 by MARCIE CHÁVEZ      Propylene Glycol/ (Systane Gel Eye Drops) 10 Ml Drops.gel      1 DROPS EYE EACH QDAY PRN for EYE PROTECTION, #10 ML         Reported         6/15/20       (Areds 2 - Eye Vit)   No Conflict Check      1 TAB PO BID         Prov: MARCIE CHÁVEZ         6/15/20       (Eye Drops - Glaucoma)   No Conflict Check      1 DROPS EYE EACH HS         Reported         6/15/20       Levothyroxine (Levothyroxine) 0.088 Mg Tablet      0.088 MG PO QDAY@07, #30  TAB 0 Refills         Reported         6/15/20       Omeprazole (priLOSEC) 40 Mg Capsule.dr      40 MG PO QDAY for 30 Days, #30 CAP 0 Refills         Reported         6/15/20       Cyanocobalamin (Vitamin B-12) 1,000 Mcg/Ml Vial      1000 MCG IM QMONTH, VIAL 0 Refills         Reported         8/20/09            Plan/Instructions      Ambulatory Assessment/Plan:        Macrocytic anemia - D53.9            Notes      New Diagnostics      * CBC With Auto Diff, 6 Months         Dx: Macrocytic anemia - D53.9      * Comp Metabolic Panel, 6 Months         Dx: Macrocytic anemia - D53.9      * LDH, 6 Months         Dx: Macrocytic anemia - D53.9      Plan/Instructions            * Plan Of Care: (Macrocytic anemia)      * Concern for MDS.  Patient's counts are stable and she continues to refuse bone      marrow biopsy.  She would like to follow-up with me every 6 months since she       has had anemia for many years.  I continue to recommend every 3-month CBC       since she has not undergone bone marrow biopsy and we do not know the exact       etiology of her cytopenia.  However, the patient is fairly insistent that we       get labs from her primary care provider and follow-up after.  Therefore I will      schedule a follow-up appointment in 6 months and attempt to get the labs from       [place].  Patient tells me that she will call if there are any new concerns      or if she is found to have worsening cytopenias.            * Chronic conditions reviewed and taken into consideration for today's treatment      plan.      * Patient instructed to seek medical attention urgently for new or worsening       symptoms.      * Patient was educated/instructed on their diagnosis, treatment and medications       prior to discharge from the clinic today.      Codes:  Phone Eval 11-20 mi [id number]            Electronically signed by MARCIE CHÁVEZ  03/22/2021 18:15       Disclaimer: Converted document may not contain table formatting or  lab diagrams. Please see [email or web address] for the authenticated document.

## 2021-05-28 NOTE — PROGRESS NOTES
"Patient: CALLI PINA     Acct: [id number]     Report: #[id number]  UNIT #: [id number]     : 1945    Encounter Date:2020  PRIMARY CARE: STEVO BOBO  ***Signed***  --------------------------------------------------------------------------------------------------------------------  TELEHEALTH NOTE      Past Oncology Illness History      Ms. Pina is a 75-year-old female who was referred to me by her primary care     provider Stevo Bobo for macrocytic anemia.            The patient's most recent labs were on 2020.  They show WBC count of 4.6,     hemoglobin 10.5, .7, platelet count 377, absolute lymphocyte count 0.61.     The rest of the differential was complete normal.  Patient states she has felt     \"crummy\" for the last year or more.  She is also been losing weight.  She has     diarrhea often after every meal.  Her last colonoscopy was a few years ago and     she believes it was incomplete.            She is on levothyroxine for hypothyroidism.  She said she had radioactive iodine    ablation in .  She reports her thyroid levels have been normal recently.      She recently had a cardiac ablation due to atrial fibrillation.  Records from     primary care provider states that she has been on monthly B12 injections for her    macrocytic anemia.  She also started taking oral iron supplementation in     ebru2020 for fatigue but this did not help.            Patient states that she saw Dr. Adkins about 5 years ago for the same issues.  She    is unaware if there is any specific diagnosis but says that she does not wish to    go back there.            2020: WBC count 5.28, hemoglobin 10.2, , platelet count 433,     reticulocytes 2.34%, LDH within normal limits, transferrin saturation and     ferritin within normal limits.            History of Present Illness            Chief Complaint: ABNORMAL CBC             Calli Pina is presenting for " evaluation via Telehealth visit by phone.     Verbal consent obtained before beginning visit.            Provider spent (7) minutes with the patient during telehealth visit.            The following staff were present during the visit: (Jhon Reese, RN)                         Overview of Symptoms      Contact the patient today regarding recent labs.  On 9/17/2020 her WBC count was    5.1, hemoglobin 11.1, platelet count 409, .1.  Her serum free light chain    ratio is 2.0 and SPEP with ANGELY is negative.  She continues to have persistent     macrocytosis.  I discussed with her the need for a bone marrow biopsy to rule     out MDS.  However the patient does not like the idea of getting a bone marrow     biopsy unless it is an absolute necessary.  At this time her counts are     relatively stable.            Allergies/Medications      Allergies:        Coded Allergies:             *No Known Allergies (Verified  Allergy, Unknown, 7/23/20)      Medications    Last Reconciled on 9/28/20 23:52 by MARCIE CHÁVEZ      Propylene Glycol/ (Systane Gel Eye Drops) 10 Ml Drops.gel      1 DROPS EYE EACH QDAY PRN for EYE PROTECTION, #10 ML         Reported         6/15/20       (Areds 2 - Eye Vit)   No Conflict Check      1 TAB PO BID         Prov: MARCIE CHÁVEZ         6/15/20       (Eye Drops - Glaucoma)   No Conflict Check      1 DROPS EYE EACH HS         Reported         6/15/20       Levothyroxine (Levothyroxine) 0.088 Mg Tablet      0.088 MG PO QDAY@07, #30 TAB 0 Refills         Reported         6/15/20       Omeprazole (priLOSEC) 40 Mg Capsule.dr      40 MG PO QDAY for 30 Days, #30 CAP 0 Refills         Reported         6/15/20       Cyanocobalamin (Vitamin B-12) 1,000 Mcg/Ml Vial      1000 MCG IM QMONTH, VIAL 0 Refills         Reported         8/20/09            Plan/Instructions      Ambulatory Assessment/Plan:        Abnormal CBC - R79.89            Anemia - D64.9            Notes      New Diagnostics       * CBC With Auto Diff, 3 Months         Dx: Abnormal CBC - R79.89      * CMP Comp Metabolic Panel, 3 Months         Dx: Abnormal CBC - R79.89      * LDH, 3 Months         Dx: Abnormal CBC - R79.89      * Reticulocyte Count, 3 Months         Dx: Abnormal CBC - R79.89      Plan/Instructions            * Plan Of Care: (Macrocytic anemia)      * Patient has persistent macrocytic anemia without B12/folate deficiency.        Patient likely has a diagnosis of MDS which would require a bone marrow biopsy      for definitive diagnosis.  At this time the patient wishes to hold off on bone      marrow biopsy until it is absolute necessary.  I will follow-up with her in 3       months with a repeat CBC, LDH, reticulocyte count, and CMP.            * Chronic conditions reviewed and taken into consideration for today's treatment      plan.      * Patient instructed to seek medical attention urgently for new or worsening       symptoms.      * Patient was educated/instructed on their diagnosis, treatment and medications       prior to discharge from the clinic today.      Codes:  Phone Eval 5-10 min [id number]            Electronically signed by MARCIE CHÁVEZ  09/28/2020 23:52       Disclaimer: Converted document may not contain table formatting or lab diagrams. Please see [employer] [facility] for the authenticated document.

## 2021-05-28 NOTE — PROGRESS NOTES
"Patient: CALLI PINA     Acct: [id number]     Report: #[id number]  UNIT #: [id number]     : 1945    Encounter Date:2020  PRIMARY CARE: STEVO BOBO  ***Signed***  --------------------------------------------------------------------------------------------------------------------  TELEHEALTH NOTE      History of Present Illness            Chief Complaint: (ABNORMAL CBC)            Calli Pina is presenting for evaluation via Telehealth visit by phone.     Verbal consent obtained before beginning visit.            Provider spent (6) minutes with the patient during telehealth visit.            The following staff were present during the visit: (Jhon Reese, SOLOMON)                         Past Med History      Ms. Pina is a 75-year-old female who was referred to me by her primary care     provider Stevo Bobo for macrocytic anemia.            The patient's most recent labs were on 2020.  They show WBC count of 4.6,     hemoglobin 10.5, .7, platelet count 377, absolute lymphocyte count 0.61.     The rest of the differential was complete normal.  Patient states she has felt     \"crummy\" for the last year or more.  She is also been losing weight.  She has     diarrhea often after every meal.  Her last colonoscopy was a few years ago and     she believes it was incomplete.            She is on levothyroxine for hypothyroidism.  She said she had radioactive iodine    ablation in .  She reports her thyroid levels have been normal recently.      She recently had a cardiac ablation due to atrial fibrillation.  Records from     primary care provider states that she has been on monthly B12 injections for her    macrocytic anemia.  She also started taking oral iron supplementation in     2020 for fatigue but this did not help.            Patient states that she saw Dr. Adkins about 5 years ago for the same issues.  She    is unaware if there is any specific " diagnosis but says that she does not wish to    go back there.      Overview of Symptoms      I contacted the patient today to discuss her recent labs.  These were done on     6/15/2020 and showed WBC count of 5.89, hemoglobin 11.0, .7, platelet     count 435, absolute lymphocyte count decreased at 0.66.  Her transferrin     saturation is 24% and ferritin 123.  TSH is normal at 1.17.  Patient has no new     or concerning symptoms today.  We did discuss again her history of diarrhea     after every meal.  I urged her to consider an evaluation by gastroenterology but    the patient is still not interested at this time.            Most Recent Lab Findings      Laboratory Tests      6/15/20 11:43            Laboratory Tests            Test       6/15/20      11:43             Ferritin       123 ng/mL      ()            Allergies/Medications      Allergies:        Coded Allergies:             *No Known Allergies (Verified  Allergy, Unknown, 6/22/20)      Medications    Last Reconciled on 6/28/20 19:12 by MARCIE CHÁVEZ      Propylene Glycol/ (Systane Gel Eye Drops) 10 Ml Drops.gel      1 DROPS EYE EACH QDAY PRN for EYE PROTECTION, #10 ML         Reported         6/15/20       (Areds 2 - Eye Vit)   No Conflict Check      1 TAB PO BID         Prov: MARCIE CHÁVEZ         6/15/20       (Eye Drops - Glaucoma)   No Conflict Check      1 DROPS EYE EACH HS         Reported         6/15/20       Levothyroxine (Levothyroxine) 0.088 Mg Tablet      0.088 MG PO QDAY@07, #30 TAB 0 Refills         Reported         6/15/20       Omeprazole (priLOSEC) 40 Mg Capsule.dr      40 MG PO QDAY for 30 Days, #30 CAP 0 Refills         Reported         6/15/20       Cyanocobalamin (Vitamin B-12) 1,000 Mcg/Ml Vial      1000 MCG IM QMONTH, VIAL 0 Refills         Reported         8/20/09            Plan/Instructions      Ambulatory Assessment/Plan:        Abnormal CBC - R79.89            Anemia - D64.9            Elevated platelet  count - R79.89            Notes      New Diagnostics      * CBC With Auto Diff, Routine         Dx: Abnormal CBC - R79.89      * Path Review Peripheral Smear, Routine         Dx: Abnormal CBC - R79.89      * LDH, Routine         Dx: Abnormal CBC - R79.89      * Haptoglobin, Routine         Dx: Abnormal CBC - R79.89      * Reticulocyte Count, Routine         Dx: Abnormal CBC - R79.89      * SPEP with Immuno (IEPS), Routine         Dx: Abnormal CBC - R79.89      * Free Light Chains Ka, Routine         Dx: Abnormal CBC - R79.89      * IMMUNOFIX PROT ELECTRO URINE, Routine         Dx: Abnormal CBC - R79.89      Plan/Instructions            * Plan Of Care: (Macrocytic anemia)      * Obvious and common causes for macrocytosis have essentially been ruled out       including B12/folate deficiency.  The differential at this point includes MDS       and multiple myeloma.  I discussed with the patient that we will likely       ultimately need to do a bone marrow biopsy to determine the etiology.  She       would like to hold off on this if at all possible.  We agreed to check       additional labs including peripheral smear, LDH, haptoglobin, reticulocyte       count, SPEP with ANGELY, UPEP with ANGELY, and serum free light chains.  I will       follow-up with her in 1 month to review the results of these.  At that time we      will likely discuss again with her the need for bone marrow biopsy.      * Chronic diarrhea: Patient states that she has diarrhea after almost every       meal.  Is possible absorption is leading to nutritional deficiencies and       therefore hematologic abnormalities.  I urged the patient to see a       gastroenterologist for EGD and colonoscopy and further evaluation.  She states      that she will continue to think about it.  We will discuss again at follow-up.            * Chronic conditions reviewed and taken into consideration for today's treatment      plan.      * Patient instructed to seek medical  attention urgently for new or worsening       symptoms.      * Patient was educated/instructed on their diagnosis, treatment and medications       prior to discharge from the clinic today.      Codes:  Phone Eval 5-10 min [id number]            Electronically signed by MARCIE CHÁVEZ  06/28/2020 19:12       Disclaimer: Converted document may not contain table formatting or lab diagrams. Please see [employer] [facility] for the authenticated document.

## 2021-05-28 NOTE — PROGRESS NOTES
"Patient: CALLI PINA     Acct: [id number]     Report: #[id number]  UNIT #: [id number]     : 1945    Encounter Date:2020  PRIMARY CARE: STEVO BOBO  ***Signed***  --------------------------------------------------------------------------------------------------------------------  TELEHEALTH NOTE      Past Oncology Illness History      Ms. Pina is a 75-year-old female who was referred to me by her primary care     provider Stevo Bobo for macrocytic anemia.            The patient's most recent labs were on 2020.  They show WBC count of 4.6,     hemoglobin 10.5, .7, platelet count 377, absolute lymphocyte count 0.61.     The rest of the differential was complete normal.  Patient states she has felt     \"crummy\" for the last year or more.  She is also been losing weight.  She has     diarrhea often after every meal.  Her last colonoscopy was a few years ago and     she believes it was incomplete.            She is on levothyroxine for hypothyroidism.  She said she had radioactive iodine    ablation in .  She reports her thyroid levels have been normal recently.      She recently had a cardiac ablation due to atrial fibrillation.  Records from     primary care provider states that she has been on monthly B12 injections for her    macrocytic anemia.  She also started taking oral iron supplementation in     ebru2020 for fatigue but this did not help.            Patient states that she saw Dr. Adkins about 5 years ago for the same issues.  She    is unaware if there is any specific diagnosis but says that she does not wish to    go back there.            2020: WBC count 5.28, hemoglobin 10.2, , platelet count 433,     reticulocytes 2.34%, LDH within normal limits, transferrin saturation and     ferritin within normal limits.            History of Present Illness            Chief Complaint: (ABN CBC)            Calli Pina is presenting for " evaluation via Telehealth visit by phone.     Verbal consent obtained before beginning visit.            Provider spent (7) minutes with the patient during telehealth visit.            The following staff were present during the visit: (Tonya Main, RN)                         Overview of Symptoms      I contacted the patient today 2 discuss her recent lab results.  These were done    at [place] on 12/15/2020.  This showed her hemoglobin be 11.1 with an MCV of     106.8.  Her platelet count remains normal at 450 and her white blood cell count     is normal at 6.1.  I explained the patient again that I suspect she has MDS     although it would not require treatment unless her counts worsen.  I would still    encourage her have a bone marrow biopsy 2 definitively make the diagnosis but     the patient continues did state that she would like a hold off until treatment     is necessary.  When we discussed her health in more detail she then admitted     that she has had a 40 pound weight loss over the past year.  She says that she     feels well otherwise and her weight is stabilized over the past 2 months.  She     was having significant diarrhea but says she was seen by her primary care     provider who did a stool test.  The stool test was negative so she started a     probiotic.  Patient has no other complaints or health problems at this time.            Allergies/Medications      Allergies:        Coded Allergies:             *No Known Allergies (Verified  Allergy, Unknown, 12/22/20)      Medications    Last Reconciled on 9/28/20 23:52 by MARCIE CHÁVEZ      Propylene Glycol/ (Systane Gel Eye Drops) 10 Ml Drops.gel      1 DROPS EYE EACH QDAY PRN for EYE PROTECTION, #10 ML         Reported         6/15/20       (Areds 2 - Eye Vit)   No Conflict Check      1 TAB PO BID         Prov: MARCIE CHÁVEZ         6/15/20       (Eye Drops - Glaucoma)   No Conflict Check      1 DROPS EYE EACH HS         Reported          6/15/20       Levothyroxine (Levothyroxine) 0.088 Mg Tablet      0.088 MG PO QDAY@07, #30 TAB 0 Refills         Reported         6/15/20       Omeprazole (priLOSEC) 40 Mg Capsule.dr      40 MG PO QDAY for 30 Days, #30 CAP 0 Refills         Reported         6/15/20       Cyanocobalamin (Vitamin B-12) 1,000 Mcg/Ml Vial      1000 MCG IM QMONTH, VIAL 0 Refills         Reported         8/20/09            Plan/Instructions      Ambulatory Assessment/Plan:        Macrocytic anemia - D53.9            Notes      New Diagnostics      * CBC With Auto Diff, 3 Months         Dx: Macrocytic anemia - D53.9      * Comp Metabolic Panel, 3 Months         Dx: Macrocytic anemia - D53.9      * LDH, 3 Months         Dx: Macrocytic anemia - D53.9      Plan/Instructions            * Plan Of Care: (Macrocytic anemia)      * Patient likely has underlying MDS but with stable counts.  She is only       slightly anemic with a hemoglobin of 11.1.  Her white blood cell count and       platelet count is normal.  Patient is not interested in getting a bone marrow       biopsy at this time so I will continue 2 follow her closely with repeat labs       in 3 deformans.      * Weight loss and diarrhea: I again encouraged the patient 2 see a       gastroenterologist for evaluation.  I am happy make the referral but the       patient declines at this time.  She will continue follow-up with her primary       care provider and bring up this issue again if it worsens.            * Chronic conditions reviewed and taken into consideration for today's treatment      plan.      * Patient instructed to seek medical attention urgently for new or worsening       symptoms.      * Patient was educated/instructed on their diagnosis, treatment and medications       prior to discharge from the clinic today.      Codes:  Phone Eval 5-10 min [id number]            Electronically signed by MARCIE CHÁVEZ  12/22/2020 16:44       Disclaimer: Converted document may not contain  table formatting or lab diagrams. Please see [employer] [facility] for the authenticated document.

## 2021-05-28 NOTE — PROGRESS NOTES
"Patient: CALLI PINA     Acct: [id number]     Report: #[id number]  UNIT #: [id number]     : 1945    Encounter Date:2020  PRIMARY CARE: STEVO BOBO  ***Signed***  --------------------------------------------------------------------------------------------------------------------  TELEHEALTH NOTE      History of Present Illness            Chief Complaint: (ABNORMAL CBC)            Calli Pina is presenting for evaluation via Telehealth visit by phone.     Verbal consent obtained before beginning visit.            Provider spent (7) minutes with the patient during telehealth visit.            The following staff were present during the visit: (Jhon Reese, SOLOMON)                         Past Med History      Ms. Pina is a 75-year-old female who was referred to me by her primary care     provider Stevo Bobo for macrocytic anemia.            The patient's most recent labs were on 2020.  They show WBC count of 4.6,     hemoglobin 10.5, .7, platelet count 377, absolute lymphocyte count 0.61.     The rest of the differential was complete normal.  Patient states she has felt     \"crummy\" for the last year or more.  She is also been losing weight.  She has     diarrhea often after every meal.  Her last colonoscopy was a few years ago and     she believes it was incomplete.            She is on levothyroxine for hypothyroidism.  She said she had radioactive iodine    ablation in .  She reports her thyroid levels have been normal recently.      She recently had a cardiac ablation due to atrial fibrillation.  Records from     primary care provider states that she has been on monthly B12 injections for her    macrocytic anemia.  She also started taking oral iron supplementation in     2020 for fatigue but this did not help.            Patient states that she saw Dr. Adkins about 5 years ago for the same issues.  She    is unaware if there is any specific " diagnosis but says that she does not wish to    go back there.            6/23/2020: WBC count 5.28, hemoglobin 10.2, , platelet count 433,     reticulocytes 2.34%, LDH within normal limits, transferrin saturation and     ferritin within normal limits.      Overview of Symptoms      The patient has no complaints today. I discussed the results of her recent labs     which continue to show macrocytic anemia.            Allergies/Medications      Allergies:        Coded Allergies:             *No Known Allergies (Verified  Allergy, Unknown, 7/23/20)      Medications    Last Reconciled on 7/29/20 21:56 by MARCIE CHÁVEZ      Propylene Glycol/ (Systane Gel Eye Drops) 10 Ml Drops.gel      1 DROPS EYE EACH QDAY PRN for EYE PROTECTION, #10 ML         Reported         6/15/20       (Areds 2 - Eye Vit)   No Conflict Check      1 TAB PO BID         Prov: MARCIE CHÁVEZ         6/15/20       (Eye Drops - Glaucoma)   No Conflict Check      1 DROPS EYE EACH HS         Reported         6/15/20       Levothyroxine (Levothyroxine) 0.088 Mg Tablet      0.088 MG PO QDAY@07, #30 TAB 0 Refills         Reported         6/15/20       Omeprazole (priLOSEC) 40 Mg Capsule.dr      40 MG PO QDAY for 30 Days, #30 CAP 0 Refills         Reported         6/15/20       Cyanocobalamin (Vitamin B-12) 1,000 Mcg/Ml Vial      1000 MCG IM QMONTH, VIAL 0 Refills         Reported         8/20/09            Plan/Instructions      Ambulatory Assessment/Plan:        Abnormal CBC - R79.89            Notes      New Diagnostics      * SPEP with Immuno (IEPS), 2 Months         Dx: Abnormal CBC - R79.89      * CMP Comp Metabolic Panel, 2 Months         Dx: Abnormal CBC - R79.89      * Reticulocyte Count, 2 Months         Dx: Abnormal CBC - R79.89      * CBC With Auto Diff, 2 Months         Dx: Abnormal CBC - R79.89      * LDH, 2 Months         Dx: Abnormal CBC - R79.89      * Free Light Chains Ka, 2 Months         Dx: Abnormal CBC - R79.89       Plan/Instructions            * Plan Of Care: (Microcytic anemia)      * Common causes of macrocytic anemia have been ruled out including B12/folate       deficiency and reticulocytosis.  The patient does not wish to have a bone       marrow biopsy since she has no symptoms of systemic disease and her counts       have been stable.  I will repeat her labs in 2 to 3 months with CBC, CMP,       reticulocyte count, SPEP, and serum free light chains.  She will follow-up       with the results by phone.            * Chronic conditions reviewed and taken into consideration for today's treatment      plan.      * Patient instructed to seek medical attention urgently for new or worsening       symptoms.      * Patient was educated/instructed on their diagnosis, treatment and medications       prior to discharge from the clinic today.      Codes:  Phone Eval 5-10 min [id number]            Electronically signed by MARCIE CHÁVEZ  07/29/2020 21:57       Disclaimer: Converted document may not contain table formatting or lab diagrams. Please see [employer] [facility] for the authenticated document.

## 2021-09-14 ENCOUNTER — OFFICE VISIT (OUTPATIENT)
Dept: OBSTETRICS AND GYNECOLOGY | Facility: CLINIC | Age: 76
End: 2021-09-14

## 2021-09-14 VITALS
HEIGHT: 65 IN | WEIGHT: 137 LBS | BODY MASS INDEX: 22.82 KG/M2 | DIASTOLIC BLOOD PRESSURE: 70 MMHG | SYSTOLIC BLOOD PRESSURE: 118 MMHG

## 2021-09-14 DIAGNOSIS — N95.2 VAGINAL ATROPHY: ICD-10-CM

## 2021-09-14 DIAGNOSIS — N81.4 CYSTOCELE WITH UTERINE PROLAPSE: Primary | ICD-10-CM

## 2021-09-14 PROCEDURE — 99214 OFFICE O/P EST MOD 30 MIN: CPT | Performed by: OBSTETRICS & GYNECOLOGY

## 2021-09-14 RX ORDER — MV-MIN/FA/VIT K/LUTEIN/ZEAXANT 200MCG-5MG
CAPSULE ORAL DAILY
COMMUNITY

## 2021-09-14 RX ORDER — ESTRADIOL 0.1 MG/G
1 CREAM VAGINAL 2 TIMES WEEKLY
Qty: 30 G | Refills: 6 | Status: SHIPPED | OUTPATIENT
Start: 2021-09-16

## 2021-09-14 RX ORDER — DORZOLAMIDE HYDROCHLORIDE AND TIMOLOL MALEATE 20; 5 MG/ML; MG/ML
SOLUTION/ DROPS OPHTHALMIC
COMMUNITY
Start: 2021-09-02

## 2021-09-14 RX ORDER — OMEPRAZOLE 40 MG/1
40 CAPSULE, DELAYED RELEASE ORAL DAILY
COMMUNITY
Start: 2021-07-14

## 2021-09-14 RX ORDER — MELOXICAM 15 MG/1
15 TABLET ORAL DAILY
COMMUNITY
Start: 2021-09-02

## 2021-09-14 RX ORDER — CYANOCOBALAMIN 1000 UG/ML
1000 INJECTION, SOLUTION INTRAMUSCULAR; SUBCUTANEOUS
COMMUNITY
Start: 2021-09-03

## 2021-09-14 RX ORDER — LATANOPROST 50 UG/ML
SOLUTION/ DROPS OPHTHALMIC
COMMUNITY
Start: 2021-09-11

## 2021-09-14 RX ORDER — LEVOTHYROXINE SODIUM 88 UG/1
88 TABLET ORAL DAILY
COMMUNITY

## 2021-09-14 NOTE — PROGRESS NOTES
"GYN Visit    CC: Bladder is falling out    HPI:   76 y.o. patient with a history of cystocele, uterine prolapse and rectocele.  Patient initially was interested in surgical management and was referred to [place] to a urogynecologist.  Patient states after that visit she has decided she does not want surgical management at this time.  She is interested in conservative management with a pessary.  She states she has recently lost 40 pounds without trying.  She is being evaluated for this by heme-onc.          History: PMHx, Meds, Allergies, PSHx, Social Hx, and POBHx all reviewed and updated.    PHYSICAL EXAM:  /70   Ht 165.1 cm (65\")   Wt 62.1 kg (137 lb)   BMI 22.80 kg/m²   General- NAD, alert and oriented, appropriate  Psych- Normal mood, good memory  External genitalia- Normal female, no lesions  Urethra/meatus- Normal, no masses, non tender, no prolapse  Bladder- Normal, no masses, non tender,  Vagina-gaping introitus with moderate to severe vaginal atrophy.   Grade 3 cystocele.  No paravaginal defects bilaterally.  Cvx- Normal, no lesions, no discharge, No cervical motion tenderness  Uterus- PROLAPSE grade 3  Adnexa- No mass, non tender  Anus/Rectum/Perineum- RECTOCELE    Lymphatic- No palpable neck, axillary, or groin nodes  Ext- No edema, no cyanosis    Skin- No lesions, no rashes, no acanthosis nigricans        ASSESSMENT AND PLAN:  Diagnoses and all orders for this visit:    1. Cystocele with uterine prolapse (Primary)  Overview:  Saw a urogyn in [place] and decided she was not interested in surgical management at that time    Interested in conservative management with pessary      2. Vaginal atrophy  Overview:  Discussed R/B of localized vaginal estrogen    Orders:  -     estradiol (ESTRACE VAGINAL) 0.1 MG/GM vaginal cream; Insert 1 g into the vagina 2 (Two) Times a Week.  Dispense: 30 g; Refill: 6      Counseling: Patient was counseled regarding the risks and benefits of pessary management.  " She was also counseled at length regarding the risks and benefits of localized vaginal estrogen.  Patient will start on estrogen vaginal cream approximately 1 g per vagina 2 times a week.  All questions were answered and informed consent was obtained        Follow Up:  Return in about 3 weeks (around 10/5/2021) for pessary fitting.          Beatriz Jain MD  09/14/2021

## 2021-09-20 ENCOUNTER — OFFICE VISIT (OUTPATIENT)
Dept: ONCOLOGY | Facility: HOSPITAL | Age: 76
End: 2021-09-20

## 2021-09-20 VITALS
RESPIRATION RATE: 20 BRPM | DIASTOLIC BLOOD PRESSURE: 69 MMHG | OXYGEN SATURATION: 94 % | SYSTOLIC BLOOD PRESSURE: 129 MMHG | WEIGHT: 137.57 LBS | BODY MASS INDEX: 22.89 KG/M2 | HEART RATE: 69 BPM | TEMPERATURE: 96.8 F

## 2021-09-20 DIAGNOSIS — R79.89 ABNORMAL CBC: Primary | ICD-10-CM

## 2021-09-20 PROBLEM — I47.1 SVT (SUPRAVENTRICULAR TACHYCARDIA): Status: ACTIVE | Noted: 2019-06-24

## 2021-09-20 PROBLEM — H26.9 CATARACT: Status: ACTIVE | Noted: 2021-09-20

## 2021-09-20 PROBLEM — H91.93 DEAFNESS, BILATERAL: Status: ACTIVE | Noted: 2021-09-20

## 2021-09-20 PROBLEM — K64.9 HEMORRHOID: Status: ACTIVE | Noted: 2021-09-20

## 2021-09-20 PROBLEM — D53.9 MACROCYTIC ANEMIA: Status: ACTIVE | Noted: 2020-04-20

## 2021-09-20 PROBLEM — I48.92 PAROXYSMAL ATRIAL FLUTTER (HCC): Status: ACTIVE | Noted: 2019-06-24

## 2021-09-20 PROBLEM — M19.90 ARTHRITIS: Status: ACTIVE | Noted: 2021-09-20

## 2021-09-20 PROBLEM — R06.02 SHORTNESS OF BREATH: Status: ACTIVE | Noted: 2021-09-20

## 2021-09-20 PROBLEM — Z95.818 STATUS POST PLACEMENT OF IMPLANTABLE LOOP RECORDER: Status: ACTIVE | Noted: 2019-11-13

## 2021-09-20 PROBLEM — E53.8 VITAMIN B12 DEFICIENCY: Status: ACTIVE | Noted: 2020-03-03

## 2021-09-20 PROBLEM — E03.9 HYPOTHYROIDISM: Status: ACTIVE | Noted: 2020-03-03

## 2021-09-20 PROBLEM — Z86.73 HISTORY OF STROKE IN ADULTHOOD: Status: ACTIVE | Noted: 2019-06-24

## 2021-09-20 PROBLEM — K80.20 GALL STONES: Status: ACTIVE | Noted: 2021-09-20

## 2021-09-20 PROBLEM — K57.92 DIVERTICULITIS: Status: ACTIVE | Noted: 2021-09-20

## 2021-09-20 PROCEDURE — G0463 HOSPITAL OUTPT CLINIC VISIT: HCPCS | Performed by: INTERNAL MEDICINE

## 2021-09-20 PROCEDURE — 99214 OFFICE O/P EST MOD 30 MIN: CPT | Performed by: INTERNAL MEDICINE

## 2021-09-20 NOTE — PROGRESS NOTES
Patient  Calli Pina    [facility] HEMATOLOGY & ONCOLOGY    Chief Complaint  ABNORMAL CBC and Follow-up    Referring Provider: No Known Provider  PCP: Ara Jones APRN    Subjective          Oncology/Hematology History    No history exists.       History of Present Illness  Patient comes in today for follow-up for macrocytic anemia.  Several of her last follow-ups have been by phone.  I explained to the patient again today in person that I am concerned about bone marrow dysplasia called MDS.  Her labs from 9/15/2021 are concerning for worsening anemia.  Her hemoglobin is now 9.8 with an MCV of 106.1.  The patient's white blood cell count is normal at 5.6 and platelet count is normal at 437.  Patient's only complaint is significant fatigue that is getting worse over the months and years  She is on weekly B12 injections.  She tells me she had lost a significant amount of weight but that seems to have plateaued now.    We discussed the need for bone marrow biopsy and the patient remains hesitant.  She first told me she was unaware that we had discussed this previously.  She is not opposed to it if it must be done but would like to avoid it if possible.      Review of Systems   Constitutional: Positive for fatigue. Negative for appetite change, diaphoresis, fever, unexpected weight gain and unexpected weight loss.   HENT: Negative for hearing loss, sore throat and voice change.    Eyes: Negative for blurred vision, double vision, pain, redness and visual disturbance.   Respiratory: Negative for cough, shortness of breath and wheezing.    Cardiovascular: Negative for chest pain, palpitations and leg swelling.   Endocrine: Negative for cold intolerance, heat intolerance, polydipsia and polyuria.   Genitourinary: Negative for decreased urine volume, difficulty urinating, frequency and urinary incontinence.   Musculoskeletal: Positive for back pain. Negative for arthralgias, joint  swelling and myalgias.   Skin: Negative for color change, rash, skin lesions and bruise.   Neurological: Negative for dizziness, seizures, numbness and headache.   Hematological: Negative for adenopathy. Does not bruise/bleed easily.   Psychiatric/Behavioral: Negative for depressed mood. The patient is not nervous/anxious.        Past Medical History:   Diagnosis Date   • Anemia    • Disease of thyroid gland      Past Surgical History:   Procedure Laterality Date   • CARDIAC ABLATION     • CHOLECYSTECTOMY     • TONSILLECTOMY       Social History     Socioeconomic History   • Marital status:      Spouse name: Not on file   • Number of children: Not on file   • Years of education: Not on file   • Highest education level: Not on file   Tobacco Use   • Smoking status: Former Smoker     Types: Cigarettes     Quit date:      Years since quittin.7   Vaping Use   • Vaping Use: Never used   Substance and Sexual Activity   • Alcohol use: Never   • Drug use: Never   • Sexual activity: Defer     History reviewed. No pertinent family history.    Objective   Physical Exam  General: Alert, cooperative, no acute distress  Eyes: Anicteric sclera, PERRLA  Respiratory: normal respiratory effort  Cardiovascular: no lower extremity edema  Skin: Normal tone, no rash, no lesions  Psychiatric: Appropriate affect, intact judgment  Neurologic: No focal sensory or motor deficits, normal cognition   Musculoskeletal: Normal muscle strength and tone  Extremities: No clubbing, cyanosis, or deformities    Vitals:    21 1322   BP: 129/69  Comment: LT ARM   Pulse: 69   Resp: 20   Temp: 96.8 °F (36 °C)   TempSrc: Temporal   SpO2: 94%  Comment: RM AIR   Weight: 62.4 kg (137 lb 9.1 oz)   PainSc: 0-No pain               PHQ-9 Total Score: 0       Result Review :   The following data was reviewed by: Caryn Timmons MD PhD on 2021:  Lab Results   Component Value Date    HGB 10.2 (L) 2020    HCT 33.9 (L) 2020     .4 (H) 06/23/2020     (H) 06/23/2020    WBC 5.28 06/23/2020    NEUTROABS 3.54 06/23/2020    LYMPHSABS 0.83 (L) 06/23/2020    MONOSABS 0.32 06/23/2020    EOSABS 0.53 06/23/2020    BASOSABS 0.05 06/23/2020     Lab Results   Component Value Date    BUN 12 06/15/2020    CREATININE 0.81 06/15/2020     06/15/2020    K 4.0 06/15/2020     06/15/2020    CO2 26 06/15/2020    CALCIUM 9.4 06/15/2020    PROTEINTOT 7.7 06/15/2020    ALBUMIN 3.8 06/15/2020    BILITOT 0.32 06/15/2020    ALKPHOS 124 06/15/2020    AST 15 06/15/2020    ALT 9 (L) 06/15/2020          Assessment and Plan    Diagnoses and all orders for this visit:    1. Abnormal CBC (Primary)  -     Iron Profile; Future  -     Ferritin; Future  -     CBC & Differential; Future  -     Folate; Future        Macrocytic anemia: I am most concerned about MDS which appears to be worsening slowly.  With the patient's hemoglobin now 9.8 it would be reasonable to consider treatment if we are certain about the diagnosis of MDS.  The patient still seems unsure about getting a bone marrow biopsy.  We agreed to wait 1 more month and repeat a CBC and peripheral smear.  I will also recheck iron studies and folate.  She is on B12 injections.  If there are not deficiencies which can be replaced then I will have to assume there is a primary bone marrow problem and will strongly recommend a bone marrow biopsy at that time.    Patient was given instructions and counseling regarding her condition or for health maintenance advice. Please see specific information pulled into the AVS if appropriate.     Caryn Timmons MD PhD    10/6/2021

## 2021-10-12 ENCOUNTER — OFFICE VISIT (OUTPATIENT)
Dept: OBSTETRICS AND GYNECOLOGY | Facility: CLINIC | Age: 76
End: 2021-10-12

## 2021-10-12 VITALS
SYSTOLIC BLOOD PRESSURE: 141 MMHG | DIASTOLIC BLOOD PRESSURE: 80 MMHG | HEART RATE: 66 BPM | WEIGHT: 137 LBS | BODY MASS INDEX: 22.8 KG/M2

## 2021-10-12 DIAGNOSIS — N95.2 VAGINAL ATROPHY: ICD-10-CM

## 2021-10-12 DIAGNOSIS — N81.4 CYSTOCELE WITH UTERINE PROLAPSE: Primary | ICD-10-CM

## 2021-10-12 PROCEDURE — A4561 PESSARY RUBBER, ANY TYPE: HCPCS | Performed by: OBSTETRICS & GYNECOLOGY

## 2021-10-12 PROCEDURE — 57160 INSERT PESSARY/OTHER DEVICE: CPT | Performed by: OBSTETRICS & GYNECOLOGY

## 2021-10-12 NOTE — PROGRESS NOTES
GYN Visit    CC: Pessary fitting    HPI:   76 y.o.No obstetric history on file. Contraception or HRT: Post menopausal  Patient is without any complaints.  She states she is tolerating the vaginal estrogen cream well however it is too expensive to continue to afford.  Is costing her over $200 a month.  She is interested in alternative treatment      History: PMHx, Meds, Allergies, PSHx, Social Hx, and POBHx all reviewed and updated.    PHYSICAL EXAM:  /80   Pulse 66   Wt 62.1 kg (137 lb)   Breastfeeding No   BMI 22.80 kg/m²   General- NAD, alert and oriented, appropriate  Psych- Normal mood, good memory  External genitalia- Normal female, no lesions  Urethra/meatus- Normal, no masses, non tender, no prolapse  Bladder- Normal, no masses, non tender, no prolapse  Vagina- Normal, mild atrophy, no lesions, no discharge, cystocele with uterine prolapse  Cvx- Normal, no lesions, no discharge, No cervical motion tenderness  Uterus- PROLAPSE  Adnexa- No mass, non tender  Anus/Rectum/Perineum- Not performed    Lymphatic- No palpable neck, axillary, or groin nodes  Ext- No edema, no cyanosis    Skin- No lesions, no rashes, no acanthosis nigricans      Patient underwent pessary fitting with a ring with support size 4.  Patient ambulated in the room without any displacement of the pessary.  Patient was able to do deep knee bends without any displacement of the pessary.  Patient was placed back in dorsal lithotomy with a chaperone continue to be present.  A pessary fitting sample was removed and the patient was fitted with her ring with support size 4 pessary      ASSESSMENT AND PLAN:  Diagnoses and all orders for this visit:    1. Cystocele with uterine prolapse (Primary)  Overview:  Saw a urogyn in [place] and decided she was not interested in surgical management at that time    Interested in conservative management with pessary    Here for initial pessary fitting  Patient was given instructions if she has  difficulty voiding to return to the office  She will follow up in 3 weeks for reexamination      2. Vaginal atrophy  Overview:  Discussed R/B of localized vaginal estrogen  Tolerating vaginal estrogen well but it is too expensive at over $200/month  Will switch to Trimosan 1 applicator PV 2x/week        Counseling:         Follow Up:  Return in about 3 weeks (around 11/2/2021) for pessary follow up.          Beatriz Jain MD  10/12/2021

## 2021-10-13 ENCOUNTER — OFFICE VISIT (OUTPATIENT)
Dept: OBSTETRICS AND GYNECOLOGY | Facility: CLINIC | Age: 76
End: 2021-10-13

## 2021-10-13 VITALS
HEART RATE: 58 BPM | WEIGHT: 137 LBS | DIASTOLIC BLOOD PRESSURE: 70 MMHG | SYSTOLIC BLOOD PRESSURE: 138 MMHG | BODY MASS INDEX: 22.8 KG/M2

## 2021-10-13 DIAGNOSIS — N95.2 VAGINAL ATROPHY: ICD-10-CM

## 2021-10-13 DIAGNOSIS — N81.4 CYSTOCELE WITH UTERINE PROLAPSE: Primary | ICD-10-CM

## 2021-10-13 PROCEDURE — 57160 INSERT PESSARY/OTHER DEVICE: CPT | Performed by: OBSTETRICS & GYNECOLOGY

## 2021-10-13 PROCEDURE — A4561 PESSARY RUBBER, ANY TYPE: HCPCS | Performed by: OBSTETRICS & GYNECOLOGY

## 2021-10-13 PROCEDURE — 99213 OFFICE O/P EST LOW 20 MIN: CPT | Performed by: OBSTETRICS & GYNECOLOGY

## 2021-10-13 NOTE — ASSESSMENT & PLAN NOTE
Fit with a ring with support #4 on 10/12/21  Pessary fell out during a bowel movement that night  Pt is here for another fitting

## 2021-10-13 NOTE — PROGRESS NOTES
Date: 10/13/21   Pessary Follow-up Visit  Patient Name: Calli Pina         : 1945    CC:   Chief Complaint   Patient presents with   • Follow-up     pessary fell out.       HPI:  Pessary type and size ring with support #4  Pain: No  Vaginal Bleeding No  Voiding complaints No  Vaginal discharge No   Any concerns Yes the pessary fell out with bowel movement the same evening it was fitted        Vitals: /70   Pulse 58   Wt 62.1 kg (137 lb)   BMI 22.80 kg/m²      Physical Exam  Vitals and nursing note reviewed. Exam conducted with a chaperone present.   Genitourinary:     Labia:         Right: Lesion present.         Left: Lesion present.       Urethra: No prolapse or urethral lesion.      Vagina: No foreign body. No lesions (mild vaginal atrophy).      Cervix: Normal.      Uterus: Normal. With uterine prolapse (grade II).       Comments: Bilateral labia minora with multiple sebaceous cysts      Pessary:  Pt was fitted with a ring with support #5.  She did not have any discomfort.  She ambulated well and did valsalva without any shifting of the pessary      Diagnoses and all orders for this visit:    1. Cystocele with uterine prolapse (Primary)  Assessment & Plan:  Fit with a ring with support #4 on 10/12/21  Pessary fell out during a bowel movement that night  Pt is here for another fitting      2. Vaginal atrophy  Assessment & Plan:  Discussed R/B of localized vaginal estrogen  Tolerating vaginal estrogen well but it is too expensive at over $200/month  Will switch to Trimosan 1 applicator PV 2x/wk      [] Pessary follow-up  [x] Prolapse  [x] Vaginal atrophy    Counseling:  [x] FU for inability to void, pain, vag bleeding, odor, or any concerns   [x] FU q 1 mo to remove/clean/replace     Medication, Labs, Referral/Consult/Informational: No orders of the defined types were placed in this encounter.        Follow-up: No follow-ups on file.               Beatriz Jain MD

## 2021-10-13 NOTE — ASSESSMENT & PLAN NOTE
Discussed R/B of localized vaginal estrogen  Tolerating vaginal estrogen well but it is too expensive at over $200/month  Will switch to Trimosan 1 applicator PV 2x/wk

## 2021-10-19 ENCOUNTER — OFFICE VISIT (OUTPATIENT)
Dept: ONCOLOGY | Facility: HOSPITAL | Age: 76
End: 2021-10-19

## 2021-10-19 DIAGNOSIS — D53.9 MACROCYTIC ANEMIA: Primary | ICD-10-CM

## 2021-10-19 PROCEDURE — 99214 OFFICE O/P EST MOD 30 MIN: CPT | Performed by: INTERNAL MEDICINE

## 2021-10-19 RX ORDER — OXYQUINOLINE SULFATE AND SODIUM LAURYL SULFATE .25; .1 MG/G; MG/G
JELLY VAGINAL
COMMUNITY
Start: 2021-10-14

## 2021-11-11 ENCOUNTER — OFFICE VISIT (OUTPATIENT)
Dept: OBSTETRICS AND GYNECOLOGY | Facility: CLINIC | Age: 76
End: 2021-11-11

## 2021-11-11 VITALS
WEIGHT: 133 LBS | HEART RATE: 62 BPM | SYSTOLIC BLOOD PRESSURE: 113 MMHG | BODY MASS INDEX: 22.13 KG/M2 | DIASTOLIC BLOOD PRESSURE: 57 MMHG

## 2021-11-11 DIAGNOSIS — N81.4 CYSTOCELE WITH UTERINE PROLAPSE: ICD-10-CM

## 2021-11-11 DIAGNOSIS — N95.2 VAGINAL ATROPHY: ICD-10-CM

## 2021-11-11 DIAGNOSIS — B37.31 VULVOVAGINITIS DUE TO YEAST: Primary | ICD-10-CM

## 2021-11-11 PROCEDURE — 99213 OFFICE O/P EST LOW 20 MIN: CPT | Performed by: OBSTETRICS & GYNECOLOGY

## 2021-11-11 RX ORDER — CLOTRIMAZOLE AND BETAMETHASONE DIPROPIONATE 10; .64 MG/G; MG/G
1 CREAM TOPICAL 2 TIMES DAILY
Qty: 15 G | Refills: 0 | Status: SHIPPED | OUTPATIENT
Start: 2021-11-11 | End: 2021-11-18

## 2021-11-11 NOTE — PROGRESS NOTES
Pessary Follow Up Visit      CC: Pessary FU  Chief Complaint   Patient presents with   • Pessary Check     pessary FU. soreness       Pessary type and size: ring with support #4  Local lidocaine jelly placed:  No    HPI:  Pain:  No  Vaginal bleeding:  No  Problems voiding: No  Vaginal discharge/odor:  No  Any concerns: No

## 2021-11-11 NOTE — PROGRESS NOTES
Date: 21   Pessary Follow-up Visit  Patient Name: Calli Pina         : 1945    CC:   Chief Complaint   Patient presents with   • Pessary Check     pessary FU. soreness       HPI:  Pessary type and size ring with support #5  Pain: No  Vaginal Bleeding No  Voiding complaints No  Vaginal discharge No   Any concerns Yes; patient is complaining of tenderness on bilateral labia majora externally.  She is now wearing a pad daily.  To be increasing urinary incontinence since her pessary was placed    Vitals: /57   Pulse 62   Wt 60.3 kg (133 lb)   BMI 22.13 kg/m²      Physical Exam  Exam conducted with a chaperone present.   Genitourinary:     Exam position: Lithotomy position.      Labia:         Right: Rash present.         Left: Rash (bilateral labia majora with inclusion cysts, erythema, and satellite lesions) present.       Urethra: No prolapse.      Vagina: No lesions (mild vaginal atrophy; cystocele is unchanged).      Cervix: Normal.      Uterus: With uterine prolapse.        Pessary: REMOVED, cleaned and replaced.  Pt tolerated well     Diagnoses and all orders for this visit:    1. Vulvovaginitis due to yeast (Primary)  -     clotrimazole-betamethasone (Lotrisone) 1-0.05 % cream; Apply 1 application topically to the appropriate area as directed 2 (Two) Times a Day for 7 days.  Dispense: 15 g; Refill: 0    2. Cystocele with uterine prolapse    3. Vaginal atrophy      [x] Pessary follow-up  [x] Prolapse  [x] Vaginal atrophy    Counseling:  [x] FU for inability to void, pain, vag bleeding, odor, or any concerns   [x] FU q 3 mo to remove/clean/replace     Medication, Labs, Referral/Consult/Informational: No orders of the defined types were placed in this encounter.        Follow-up: Return in about 3 months (around 2022) for pessary follow up.               Beatriz Jain MD

## 2022-01-24 ENCOUNTER — OFFICE VISIT (OUTPATIENT)
Dept: ONCOLOGY | Facility: HOSPITAL | Age: 77
End: 2022-01-24

## 2022-01-24 VITALS
DIASTOLIC BLOOD PRESSURE: 56 MMHG | RESPIRATION RATE: 18 BRPM | OXYGEN SATURATION: 97 % | HEART RATE: 64 BPM | WEIGHT: 132.72 LBS | TEMPERATURE: 97.5 F | BODY MASS INDEX: 22.09 KG/M2 | SYSTOLIC BLOOD PRESSURE: 126 MMHG

## 2022-01-24 DIAGNOSIS — D53.9 MACROCYTIC ANEMIA: Primary | ICD-10-CM

## 2022-01-24 PROCEDURE — 99213 OFFICE O/P EST LOW 20 MIN: CPT | Performed by: INTERNAL MEDICINE

## 2022-01-24 PROCEDURE — G0463 HOSPITAL OUTPT CLINIC VISIT: HCPCS | Performed by: INTERNAL MEDICINE

## 2022-01-24 RX ORDER — CHOLECALCIFEROL (VITAMIN D3) 1250 MCG
CAPSULE ORAL
COMMUNITY
Start: 2021-11-10

## 2022-01-24 NOTE — PROGRESS NOTES
Patient  Calli Pina    [facility] HEMATOLOGY & ONCOLOGY    Chief Complaint  Anemia (-fu)    Referring Provider: ROMI Solis  PCP: Marvin Singletary APRN    Subjective          Oncology/Hematology History Overview Note       Macrocytic Anemia:  Concerning for MDS but the patient has opted for monitoring of blood counts and avoiding  bone marrow biopsy unless anemia significantly worsens.           History of Present Illness  Patient comes in today for follow-up of macrocytic anemia.  Her labs were done on 1/18/2020 to an outside hospital.  I reviewed her CBC which shows a normal WBC count, normal platelet count 348, and stable hemoglobin at 10.4.  Her MCV remains very elevated at 109.  She has normal B12 and folate levels.  Reviewed her iron saturation which shows a ferritin of 49 and transferrin saturation of 23%.  She tells me that she is currently on oral iron, oral folate, and B12 injections.  These are all prescribed by her primary care provider.    I reviewed her medical history.  Have been seeing her for nearly 2 years for the same macrocytic anemia concern.  She was also seen by another hematologist 5 years prior.  Therefore, she has had the same condition which has been stable for about 7 years.    I again discussed the presumed diagnosis of MDS.  Patient seemed confused as if she had never heard me discuss this before.  I explained that this is a chronic condition that would not require treatment unless her hemoglobin consistently fell below 10.  In that case I would more strongly urged her to get a bone marrow biopsy to better evaluate.  The patient does not wish to follow-up here in clinic more often than she needs to.  She comes from [place] and sees her primary care provider at least every 6 months for routine labs.    Review of Systems   Constitutional: Positive for fatigue. Negative for appetite change, diaphoresis, fever, unexpected weight gain and  unexpected weight loss.   HENT: Negative for hearing loss, sore throat and voice change.    Eyes: Negative for blurred vision, double vision, pain, redness and visual disturbance.   Respiratory: Negative for cough, shortness of breath and wheezing.    Cardiovascular: Negative for chest pain, palpitations and leg swelling.   Endocrine: Negative for cold intolerance, heat intolerance, polydipsia and polyuria.   Genitourinary: Negative for decreased urine volume, difficulty urinating, frequency and urinary incontinence.   Musculoskeletal: Negative for arthralgias, back pain, joint swelling and myalgias.   Skin: Negative for color change, rash, skin lesions and wound.   Neurological: Negative for dizziness, seizures, numbness and headache.   Hematological: Negative for adenopathy. Does not bruise/bleed easily.   Psychiatric/Behavioral: Negative for depressed mood. The patient is not nervous/anxious.    All other systems reviewed and are negative.      Past Medical History:   Diagnosis Date   • Anemia    • Disease of thyroid gland      Past Surgical History:   Procedure Laterality Date   • CARDIAC ABLATION     • CHOLECYSTECTOMY     • TONSILLECTOMY       Social History     Socioeconomic History   • Marital status:    Tobacco Use   • Smoking status: Former Smoker     Types: Cigarettes     Quit date:      Years since quittin.0   Vaping Use   • Vaping Use: Never used   Substance and Sexual Activity   • Alcohol use: Never   • Drug use: Never   • Sexual activity: Defer     History reviewed. No pertinent family history.    Objective   Physical Exam  General: Alert, cooperative, no acute distress, well appearing  Eyes: Anicteric sclera, PERRLA  Respiratory: normal respiratory effort, CTA B  Cardiovascular: no lower extremity edema  Skin: Normal tone, no rash, no lesions  Psychiatric: Appropriate affect, intact judgment  Neurologic: No focal sensory or motor deficits, normal cognition   Musculoskeletal: Normal  muscle strength and tone  Extremities: No clubbing, cyanosis, or deformities    Vitals:    01/24/22 0916   BP: 126/56   Pulse: 64   Resp: 18   Temp: 97.5 °F (36.4 °C)   SpO2: 97%   Weight: 60.2 kg (132 lb 11.5 oz)   PainSc: 0-No pain     ECOG score: 0         PHQ-9 Total Score: 0       Result Review :   The following data was reviewed by: Caryn Timmons MD PhD on 01/24/2022:  Lab Results   Component Value Date    HGB 10.2 (L) 06/23/2020    HCT 33.9 (L) 06/23/2020    .4 (H) 06/23/2020     (H) 06/23/2020    WBC 5.28 06/23/2020    NEUTROABS 3.54 06/23/2020    LYMPHSABS 0.83 (L) 06/23/2020    MONOSABS 0.32 06/23/2020    EOSABS 0.53 06/23/2020    BASOSABS 0.05 06/23/2020     Lab Results   Component Value Date    GLUCOSE 98 06/15/2020    BUN 12 06/15/2020    CREATININE 0.81 06/15/2020     06/15/2020    K 4.0 06/15/2020     06/15/2020    CO2 26 06/15/2020    CALCIUM 9.4 06/15/2020    PROTEINTOT 7.7 06/15/2020    ALBUMIN 3.8 06/15/2020    BILITOT 0.32 06/15/2020    ALKPHOS 124 06/15/2020    AST 15 06/15/2020    ALT 9 (L) 06/15/2020          Assessment and Plan    Diagnoses and all orders for this visit:    1. Macrocytic anemia (Primary)  -     CBC & Differential; Future  -     Ferritin; Future  -     Iron Profile; Future  -     Vitamin B12; Future  -     Folate; Future  -     CBC & Differential; Future  -     Ferritin; Future  -     Iron Profile; Future      Macrocytic anemia: I suspect this is secondary to undiagnosed MDS.  The patient's labs have essentially been stable for 7 years.  She has no new health concerns at this time.  She has no evidence of iron deficiency or B12/folate deficiency at this time.  I will defer ongoing supplementation to her primary care provider.  I will follow up with the patient in 6 months for repeat CBC with differential.  If her counts worsen or the patient develops symptoms of concern then she is urged to return to clinic sooner for evaluation.    Patient was  given instructions and counseling regarding her condition or for health maintenance advice. Please see specific information pulled into the AVS if appropriate.     Caryn Timmons MD PhD    1/24/2022

## 2022-07-29 ENCOUNTER — OFFICE VISIT (OUTPATIENT)
Dept: ONCOLOGY | Facility: HOSPITAL | Age: 77
End: 2022-07-29

## 2022-07-29 VITALS
DIASTOLIC BLOOD PRESSURE: 54 MMHG | TEMPERATURE: 97.1 F | BODY MASS INDEX: 22.64 KG/M2 | HEART RATE: 72 BPM | SYSTOLIC BLOOD PRESSURE: 123 MMHG | WEIGHT: 136.02 LBS | RESPIRATION RATE: 16 BRPM | OXYGEN SATURATION: 94 %

## 2022-07-29 DIAGNOSIS — D53.9 MACROCYTIC ANEMIA: Primary | ICD-10-CM

## 2022-07-29 PROCEDURE — G0463 HOSPITAL OUTPT CLINIC VISIT: HCPCS | Performed by: NURSE PRACTITIONER

## 2022-07-29 PROCEDURE — 99213 OFFICE O/P EST LOW 20 MIN: CPT | Performed by: NURSE PRACTITIONER

## 2022-07-29 NOTE — PROGRESS NOTES
Chief Complaint  Macrocytic anemia, MDS    Marvin Singletary, Marvin Prieto, ROMI      Subjective          Calli Joyce Pina presents to [facility] for macrocytic anemia, MDS    History of Present Illness   Presents for 6 month follow up. She is doing well in the interim. Offers no complaints at today's visit. Weight is stable. No bleeding or excessive bruising noted.     History of macrocytic anemia and possible MDS, but has never had a bone marrow biopsy. Follows with Dr. Timmons.     Recent labs done at [facility] are stable. Still with mild anemia with hemoglobin above 10. Fatigue is stable. She is taking oral iron daily. Folic acid supplementation daily as well as B-12 injections monthly.     7/21/2022: wBC 5.6, Hemoglobin 10.3, , platelet count 390.   1/21/2022: WBC 5.0, hemoglobin 10.4,  , platelet count 348,           Oncology/Hematology History Overview Note       Macrocytic Anemia:  Concerning for MDS but the patient has opted for monitoring of blood counts and avoiding  bone marrow biopsy unless anemia significantly worsens.           Review of Systems   Constitutional: Positive for fatigue.   Eyes: Negative.    Respiratory: Negative.    Cardiovascular: Negative.    Endocrine: Negative.    Musculoskeletal: Negative.    Allergic/Immunologic: Negative.    Neurological: Negative.    Hematological: Negative.    Psychiatric/Behavioral: Negative.    All other systems reviewed and are negative.      Current Outpatient Medications on File Prior to Visit   Medication Sig Dispense Refill   • Cholecalciferol (Vitamin D3) 1.25 MG (50595 UT) capsule      • cyanocobalamin 1000 MCG/ML injection Inject 1,000 mcg into the appropriate muscle as directed by prescriber Every 28 (Twenty-Eight) Days.     • dorzolamide-timolol (COSOPT) 22.3-6.8 MG/ML ophthalmic solution INSTILL 1 DROP INTO EACH EYE TWICE DAILY     • estradiol (ESTRACE VAGINAL) 0.1 MG/GM vaginal  cream Insert 1 g into the vagina 2 (Two) Times a Week. 30 g 6   • latanoprost (XALATAN) 0.005 % ophthalmic solution INSTILL 1 DROP INTO EACH EYE AT BEDTIME     • levothyroxine (SYNTHROID, LEVOTHROID) 88 MCG tablet Take 88 mcg by mouth Daily.     • meloxicam (MOBIC) 15 MG tablet Take 15 mg by mouth Daily.     • Multiple Vitamins-Minerals (PreserVision AREDS 2+Multi Vit) capsule Take  by mouth Daily.     • omeprazole (priLOSEC) 40 MG capsule Take 40 mg by mouth Daily.     • Trimo-Ivllalobos 0.025-0.01 % gel        No current facility-administered medications on file prior to visit.       No Known Allergies  Past Medical History:   Diagnosis Date   • Anemia    • Disease of thyroid gland      Past Surgical History:   Procedure Laterality Date   • CARDIAC ABLATION     • CHOLECYSTECTOMY     • TONSILLECTOMY       Social History     Socioeconomic History   • Marital status:    Tobacco Use   • Smoking status: Former Smoker     Types: Cigarettes     Quit date: 2000     Years since quittin.5   Vaping Use   • Vaping Use: Never used   Substance and Sexual Activity   • Alcohol use: Never   • Drug use: Never   • Sexual activity: Defer     History reviewed. No pertinent family history.  Immunization History   Administered Date(s) Administered   • COVID-19 (MODERNA) 1st, 2nd, 3rd Dose Only 2021, 2021, 10/28/2021   • Fluzone High Dose =>65 Years ([employer] ONLY) 10/01/2019       Objective   Physical Exam  Vitals reviewed.   Constitutional:       Appearance: Normal appearance. She is normal weight.   HENT:      Nose: Nose normal.      Mouth/Throat:      Mouth: Mucous membranes are moist.   Eyes:      Pupils: Pupils are equal, round, and reactive to light.   Cardiovascular:      Rate and Rhythm: Normal rate.      Pulses: Normal pulses.      Heart sounds: Normal heart sounds. No murmur heard.  Pulmonary:      Effort: Pulmonary effort is normal. No respiratory distress.      Breath sounds: Normal breath sounds.    Abdominal:      General: Bowel sounds are normal.      Palpations: Abdomen is soft.   Musculoskeletal:         General: Normal range of motion.      Cervical back: Normal range of motion and neck supple.   Skin:     General: Skin is warm and dry.      Capillary Refill: Capillary refill takes less than 2 seconds.   Neurological:      General: No focal deficit present.      Mental Status: She is alert and oriented to person, place, and time.   Psychiatric:         Mood and Affect: Mood normal.         Behavior: Behavior normal.         Judgment: Judgment normal.         Vitals:    07/29/22 0927   BP: 123/54   Pulse: 72   Resp: 16   Temp: 97.1 °F (36.2 °C)   SpO2: 94%   Weight: 61.7 kg (136 lb 0.4 oz)   PainSc: 0-No pain     ECOG score: 0         ECOG: (0) Fully Active - Able to Carry On All Pre-disease Performance Without Restriction  Fall Risk Assessment was completed, and patient is at low risk for falls.  PHQ-9 Total Score: 0       The patient is  experiencing fatigue. Fatigue score: 5    PT/OT Functional Screening: PT fx screen: No needs identified  Speech Functional Screening: Speech fx screen: No needs identified  Rehab to be ordered: Rehab to be ordered: No needs identified        Result Review :   The following data was reviewed by: ROMI Watters on 07/29/2022:  Lab Results   Component Value Date    HGB 10.2 (L) 06/23/2020    HCT 33.9 (L) 06/23/2020    .4 (H) 06/23/2020     (H) 06/23/2020    WBC 5.28 06/23/2020    NEUTROABS 3.54 06/23/2020    LYMPHSABS 0.83 (L) 06/23/2020    MONOSABS 0.32 06/23/2020    EOSABS 0.53 06/23/2020    BASOSABS 0.05 06/23/2020     Lab Results   Component Value Date    GLUCOSE 98 06/15/2020    BUN 12 06/15/2020    CREATININE 0.81 06/15/2020     06/15/2020    K 4.0 06/15/2020     06/15/2020    CO2 26 06/15/2020    CALCIUM 9.4 06/15/2020    PROTEINTOT 7.7 06/15/2020    ALBUMIN 3.8 06/15/2020    BILITOT 0.32 06/15/2020    ALKPHOS 124 06/15/2020     AST 15 06/15/2020    ALT 9 (L) 06/15/2020          Assessment and Plan    Diagnoses and all orders for this visit:    1. Macrocytic anemia (Primary)  -     CBC & Differential; Future  -     Iron Profile; Future  -     Ferritin; Future  -     Folate; Future  -     Vitamin B12; Future    Stable macrocytic anemia. Concern for MDS as well given macrocytosis, but wishes to follow with lab work only. We discussed she can take the oral iron every other day if she wishes. Continue B-12 injections and folic acid supp.     Repeat labs in 6 months and follow up with Dr. Timmons.   Labs to be done at [facility].         Patient Follow Up: 6 months with Dr. Timmons  Patient was given instructions and counseling regarding her condition or for health maintenance advice. Please see specific information pulled into the AVS if appropriate.     Rivka Elliott, APRN    7/29/2022